# Patient Record
Sex: FEMALE | Race: WHITE | NOT HISPANIC OR LATINO | Employment: OTHER | URBAN - METROPOLITAN AREA
[De-identification: names, ages, dates, MRNs, and addresses within clinical notes are randomized per-mention and may not be internally consistent; named-entity substitution may affect disease eponyms.]

---

## 2021-03-04 ENCOUNTER — LAB VISIT (OUTPATIENT)
Dept: LAB | Facility: OTHER | Age: 76
End: 2021-03-04
Payer: MEDICARE

## 2021-03-04 DIAGNOSIS — Z20.822 ENCOUNTER FOR LABORATORY TESTING FOR COVID-19 VIRUS: ICD-10-CM

## 2021-03-04 PROCEDURE — U0003 INFECTIOUS AGENT DETECTION BY NUCLEIC ACID (DNA OR RNA); SEVERE ACUTE RESPIRATORY SYNDROME CORONAVIRUS 2 (SARS-COV-2) (CORONAVIRUS DISEASE [COVID-19]), AMPLIFIED PROBE TECHNIQUE, MAKING USE OF HIGH THROUGHPUT TECHNOLOGIES AS DESCRIBED BY CMS-2020-01-R: HCPCS | Performed by: NURSE PRACTITIONER

## 2021-03-05 LAB — SARS-COV-2 RNA RESP QL NAA+PROBE: NOT DETECTED

## 2021-03-11 ENCOUNTER — LAB VISIT (OUTPATIENT)
Dept: LAB | Facility: OTHER | Age: 76
End: 2021-03-11
Payer: MEDICARE

## 2021-03-11 DIAGNOSIS — Z20.822 ENCOUNTER FOR LABORATORY TESTING FOR COVID-19 VIRUS: ICD-10-CM

## 2021-03-11 PROCEDURE — U0003 INFECTIOUS AGENT DETECTION BY NUCLEIC ACID (DNA OR RNA); SEVERE ACUTE RESPIRATORY SYNDROME CORONAVIRUS 2 (SARS-COV-2) (CORONAVIRUS DISEASE [COVID-19]), AMPLIFIED PROBE TECHNIQUE, MAKING USE OF HIGH THROUGHPUT TECHNOLOGIES AS DESCRIBED BY CMS-2020-01-R: HCPCS | Performed by: NURSE PRACTITIONER

## 2021-03-12 LAB — SARS-COV-2 RNA RESP QL NAA+PROBE: NOT DETECTED

## 2021-03-18 ENCOUNTER — LAB VISIT (OUTPATIENT)
Dept: LAB | Facility: OTHER | Age: 76
End: 2021-03-18
Payer: MEDICARE

## 2021-03-18 DIAGNOSIS — Z20.822 ENCOUNTER FOR LABORATORY TESTING FOR COVID-19 VIRUS: ICD-10-CM

## 2021-03-18 PROCEDURE — U0003 INFECTIOUS AGENT DETECTION BY NUCLEIC ACID (DNA OR RNA); SEVERE ACUTE RESPIRATORY SYNDROME CORONAVIRUS 2 (SARS-COV-2) (CORONAVIRUS DISEASE [COVID-19]), AMPLIFIED PROBE TECHNIQUE, MAKING USE OF HIGH THROUGHPUT TECHNOLOGIES AS DESCRIBED BY CMS-2020-01-R: HCPCS | Performed by: NURSE PRACTITIONER

## 2021-03-19 LAB — SARS-COV-2 RNA RESP QL NAA+PROBE: NOT DETECTED

## 2022-03-27 ENCOUNTER — HOSPITAL ENCOUNTER (EMERGENCY)
Facility: HOSPITAL | Age: 77
Discharge: NURSING, REHAB OR LONG-TERM CARE | End: 2022-03-28
Attending: EMERGENCY MEDICINE
Payer: MEDICARE

## 2022-03-27 DIAGNOSIS — S32.010A CLOSED COMPRESSION FRACTURE OF BODY OF L1 VERTEBRA: Primary | ICD-10-CM

## 2022-03-27 DIAGNOSIS — M25.552 LEFT HIP PAIN: ICD-10-CM
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
## 2022-03-27 DIAGNOSIS — M79.605 LEFT LEG PAIN: ICD-10-CM

## 2022-03-27 PROCEDURE — 25000003 PHARM REV CODE 250: Performed by: EMERGENCY MEDICINE

## 2022-03-27 PROCEDURE — 99284 EMERGENCY DEPT VISIT MOD MDM: CPT | Mod: 25

## 2022-03-27 RX ORDER — ERGOCALCIFEROL 1.25 MG/1
50000 CAPSULE ORAL
COMMUNITY

## 2022-03-27 RX ORDER — TRAMADOL HYDROCHLORIDE 50 MG/1
50 TABLET ORAL EVERY 6 HOURS PRN
COMMUNITY
Start: 2022-03-14 | End: 2022-03-27

## 2022-03-27 RX ORDER — MIRABEGRON 25 MG/1
25 TABLET, FILM COATED, EXTENDED RELEASE ORAL DAILY
COMMUNITY
Start: 2021-10-07 | End: 2022-03-27

## 2022-03-27 RX ORDER — DONEPEZIL HYDROCHLORIDE 5 MG/1
5 TABLET, FILM COATED ORAL NIGHTLY
COMMUNITY

## 2022-03-27 RX ORDER — AMLODIPINE BESYLATE 2.5 MG/1
2.5 TABLET ORAL DAILY
COMMUNITY

## 2022-03-27 RX ORDER — HYDROCODONE BITARTRATE AND ACETAMINOPHEN 5; 325 MG/1; MG/1
1 TABLET ORAL
Status: COMPLETED | OUTPATIENT
Start: 2022-03-27 | End: 2022-03-27

## 2022-03-27 RX ORDER — APIXABAN 2.5 MG/1
2.5 TABLET, FILM COATED ORAL 2 TIMES DAILY
COMMUNITY
Start: 2022-01-31

## 2022-03-27 RX ORDER — QUETIAPINE FUMARATE 25 MG/1
25 TABLET, FILM COATED ORAL NIGHTLY
COMMUNITY

## 2022-03-27 RX ORDER — OXYBUTYNIN CHLORIDE 5 MG/1
5 TABLET ORAL EVERY MORNING
COMMUNITY

## 2022-03-27 RX ORDER — ACETAMINOPHEN 325 MG/1
650 TABLET ORAL
Status: COMPLETED | OUTPATIENT
Start: 2022-03-27 | End: 2022-03-27

## 2022-03-27 RX ORDER — LOSARTAN POTASSIUM 50 MG/1
50 TABLET ORAL 2 TIMES DAILY
COMMUNITY

## 2022-03-27 RX ADMIN — HYDROCODONE BITARTRATE AND ACETAMINOPHEN 1 TABLET: 5; 325 TABLET ORAL at 10:03

## 2022-03-27 RX ADMIN — ACETAMINOPHEN 650 MG: 325 TABLET ORAL at 08:03

## 2022-03-27 NOTE — PHARMACY MED REC
"[facility]           Pharmacy  Admission Medication History     The home medication history was taken by Moraima Yoon.      Medication history obtained from Medications listed below were obtained from: Nursing home    Based on information gathered for medication list, you may go to "Admission" then "Reconcile Home Medications" tabs to review and/or act upon those items.      The home medication list has been updated by the Pharmacy department.    Please read ALL comments highlighted in yellow.    Please address this information as you see fit.     Feel free to contact us if you have any questions or require assistance.    The medications listed below were removed from the home medication list.  Please reorder if appropriate:     Patient reports NOT TAKING the following medication(s):  o Tramadol 50mg  o myrbetriq 25mg      No current facility-administered medications on file prior to encounter.     Current Outpatient Medications on File Prior to Encounter   Medication Sig Dispense Refill    amLODIPine (NORVASC) 2.5 MG tablet Take 2.5 mg by mouth once daily.      donepeziL (ARICEPT) 5 MG tablet Take 5 mg by mouth every evening.      ergocalciferol (ERGOCALCIFEROL) 50,000 unit Cap Take 50,000 Units by mouth every Wednesday.      losartan (COZAAR) 50 MG tablet Take 50 mg by mouth 2 (two) times a day.      oxybutynin (DITROPAN) 5 MG Tab Take 5 mg by mouth every morning.      QUEtiapine (SEROQUEL) 25 MG Tab Take 25 mg by mouth every evening.      ELIQUIS 2.5 mg Tab Take 2.5 mg by mouth 2 (two) times daily.      [DISCONTINUED] MYRBETRIQ 25 mg Tb24 ER tablet Take 25 mg by mouth once daily.      [DISCONTINUED] traMADoL (ULTRAM) 50 mg tablet Take 50 mg by mouth every 6 (six) hours as needed.         Please address this information as you see fit.  Feel free to contact us if you have any questions or require assistance.    Moraima Yoon  [phone]                  .          "

## 2022-03-27 NOTE — ED PROVIDER NOTES
Encounter Date: 3/27/2022       History     Chief Complaint   Patient presents with    Fall     Unwitnessed fall at nursing home this morning. Has been ambulating all day with baseline MS. Nursing staff noticed that she was limping this afternoon. - presents awake, alert with c/o left hip pain. No shortening or rotation. + pulses.     Patient is a 77 yo WF who presents to the ED via EMS with the complaint of fall. Pt reportedly had an unwitnessed fall at her nursing home this AM. Pt states that she slipped of the toilet earlier today. Pt was seen limping this afternoon by NH staff and was subsequently sent to the ED for evaluation. Pt reports pain to the L hip and femur. She denies hitting her head or any LOC.         Review of patient's allergies indicates:  No Known Allergies  Past Medical History:   Diagnosis Date    Hypertension     Stroke     Unspecified dementia without behavioral disturbance     Vitamin D deficiency      History reviewed. No pertinent surgical history.  History reviewed. No pertinent family history.  Social History     Tobacco Use    Smoking status: Unknown If Ever Smoked   Substance Use Topics    Alcohol use: Not Currently    Drug use: Never     Review of Systems   Constitutional: Negative for chills and fever.   Gastrointestinal: Negative for abdominal pain and nausea.   Genitourinary: Negative for dysuria and flank pain.   Musculoskeletal: Positive for back pain and gait problem. Negative for neck pain and neck stiffness.   Neurological: Negative for dizziness and headaches.   Psychiatric/Behavioral: Negative for agitation and confusion.       Physical Exam     Initial Vitals [03/27/22 1704]   BP Pulse Resp Temp SpO2   (!) 181/103 83 16 97.6 °F (36.4 °C) 98 %      MAP       --         Physical Exam    Constitutional: She appears well-developed and well-nourished.   HENT:   Head: Normocephalic and atraumatic.   Right Ear: External ear normal.   Left Ear: External ear normal.   No  signs of head trauma    Eyes: EOM are normal. Pupils are equal, round, and reactive to light.   Neck: Neck supple.   Normal ROM    Normal range of motion.  Pulmonary/Chest: Breath sounds normal.   Abdominal: Abdomen is soft. Bowel sounds are normal.   Musculoskeletal:         General: Normal range of motion.      Cervical back: Normal range of motion and neck supple.      Comments: Mild tenderness to the lower lumbar region  Normal ROM of the hip joint  Normal ROM of the L knee joint      Neurological: She is alert and oriented to person, place, and time. She has normal strength. GCS score is 15. GCS eye subscore is 4. GCS verbal subscore is 5. GCS motor subscore is 6.   Skin: Skin is warm. Capillary refill takes less than 2 seconds.         ED Course   Procedures  Labs Reviewed - No data to display       Imaging Results           CT Head Without Contrast (Final result)  Result time 03/27/22 18:58:01    Final result by Tristan Mazariegos DO (03/27/22 18:58:01)                 Impression:      1. Small nonspecific hyperdensities in the left cerebellar hemisphere and the right jeanna-liliana as above.  Findings may be related to dystrophic calcification or vascular malformations such as cavernomas or capillary telangiectasias.  Given history of trauma, tiny parenchymal hemorrhages are not excluded.  Follow-up imaging is recommended with consideration for brain MRI.  2. Thin CSF density right cerebral convexity extra-axial collection compatible with a chronic subdural hematoma or subdural hygroma.  3. Encephalomalacia in the right occipital lobe with remote changes of right parietooccipital craniotomy.  4. Remote lacunar infarction within the right basal ganglia.  5. Senescent changes and chronic microvascular ischemic changes.  This report was flagged in Epic as abnormal.    Dr. Mazariegos discussed critical findings with Dr. Stephens by telephone at 18:52 on 03/27/2022.      Electronically signed by: Tristan  Delilah  Date:    03/27/2022  Time:    18:58             Narrative:    EXAMINATION:  CT HEAD WITHOUT CONTRAST    CLINICAL HISTORY:  Head trauma, minor (Age >= 65y);    TECHNIQUE:  Low dose axial CT images obtained throughout the head without intravenous contrast. Sagittal and coronal reconstructions were performed.    COMPARISON:  None available.    FINDINGS:  There is a small hyperdense focus within the left cerebellar hemisphere measuring up to approximately 0.6 cm.  There are 2 nonspecific punctate hyperdensities in the right aspect of the liliana.    There is a thin CSF density right cerebral convexity extra-axial fluid collection compatible with a chronic subdural hematoma versus subdural hygroma.    There is encephalomalacia within the right occipital lobe.    There is a remote lacunar infarction within the right basal ganglia.    There is diffuse brain parenchymal volume loss and prominence of the CSF spaces.  No hydrocephalus.  There is hypoattenuation in the supratentorial white matter compatible with chronic microvascular ischemic changes.    There are atherosclerotic calcifications of the bilateral intracranial ICAs.    There are postop changes of right parietooccipital craniotomy.  The calvarium is otherwise intact.  The scalp is unremarkable.  Bilateral paranasal sinuses and mastoid air cells are clear.                               X-Ray Lumbar Spine Ap And Lateral (Final result)  Result time 03/27/22 18:59:41    Final result by Tristan Mazariegos DO (03/27/22 18:59:41)                 Impression:      Age-indeterminate compression fracture of the L1 vertebral body.  Correlate with point tenderness to assess acuity.    Multilevel degenerative changes as above.      Electronically signed by: Tristan Mazariegos  Date:    03/27/2022  Time:    18:59             Narrative:    EXAMINATION:  XR LUMBAR SPINE AP AND LATERAL    CLINICAL HISTORY:  back pain;    TECHNIQUE:  AP, lateral and spot images were performed of the  lumbar spine.    COMPARISON:  None    FINDINGS:  There is an age-indeterminate compression fracture of the L1 vertebral body with approximately 50% loss of height and retropulsion measuring approximately 5 mm.  Remaining vertebral body heights are maintained.  There is moderate disc height loss at L2-L3.  There are multilevel degenerative changes with facet arthropathy at L3-L4 on the left and at L5-S1.  Remaining visualized osseous structures are intact.  There are right upper quadrant surgical clips.  There are atherosclerotic calcifications.                               X-Ray Hips Bilateral 2 View Incl AP Pelvis (Final result)  Result time 03/27/22 19:01:34    Final result by Tristan Mazariegos DO (03/27/22 19:01:34)                 Impression:      No evidence of an acute fracture or dislocation of the pelvis or bilateral hips.      Electronically signed by: Tristan Mazariegos  Date:    03/27/2022  Time:    19:01             Narrative:    EXAMINATION:  XR HIPS BILATERAL 2 VIEW INCL AP PELVIS    CLINICAL HISTORY:  Pain in left hip    TECHNIQUE:  AP view of the pelvis and frogleg lateral views of both hips were performed.    COMPARISON:  None.    FINDINGS:  There is osteopenia.  There is no evidence of acute fracture or dislocation of the pelvis or bilateral hips.  The femoral heads are well seated within the acetabula.  Alignment is normal.  There is a calcified density overlying the right aspect of the pubic symphysis, likely an overlying phlebolith.                               X-Ray Femur Ap/Lat Left (Final result)  Result time 03/27/22 19:03:10    Final result by Tristan Mazariegos DO (03/27/22 19:03:10)                 Impression:      No acute osseous abnormality of the left femur.      Electronically signed by: Tristan Mazariegos  Date:    03/27/2022  Time:    19:03             Narrative:    EXAMINATION:  XR FEMUR 2 VIEW LEFT    CLINICAL HISTORY:  Pain in left leg    TECHNIQUE:  AP and lateral views of the left  femur were performed.    COMPARISON:  None.    FINDINGS:  There is osteopenia.  There is no evidence of acute fracture or dislocation of the left femur.  Alignment is normal.  Joint spaces are preserved.                                 Medications   acetaminophen tablet 650 mg (650 mg Oral Given 3/27/22 2035)     Medical Decision Making:   Clinical Tests:   Radiological Study: Ordered and Reviewed  ED Management:  - CT head WO demonstrates small nonspecific hyperdensities in the left cerebellar hemisphere and the right jeanna-liliana as above.  Findings may be related to dystrophic calcification or vascular malformations such as cavernomas or capillary telangiectasias.  Given history of trauma, tiny parenchymal hemorrhages are not excluded.  Follow-up imaging is recommended with consideration for brain MRI; thin CSF density right cerebral convexity extra-axial collection compatible with a chronic subdural hematoma or subdural hygroma; encephalomalacia in the right occipital lobe with remote changes of right parietooccipital craniotomy; remote lacunar infarction within the right basal ganglia; senescent changes and chronic microvascular ischemic changes per final radiology read (discussed CT head findings with reading radiologist, Dr. Tristan Mazariegos; he did not feel that the patient required a STAT MRI in light of his reported findings)   - plain radiograph of the lumbar spine demonstrates an L1 compression fracture   - plain radiograph of the L femur demonstrates no acute abnormality per my interpretation, final radiology read  - plan radiograph of the bilateral hips and pelvis demonstrates no acute fracture or dislocation per my interpretation, final radiology read  - will provide TLSO brace to patient in light of age-indeterminate L1 compression fracture; will recommend appropriately-dosed Tylenol, as needed, for pain; recommend patient avoids strenuous activities, no heavy lifting or bending   - No further intervention  is indicated at this time after having taken into account the patient's history, physical exam findings, and empirical and objective data obtained during the patient's emergency department workup.   - The patient is at low risk for an emergent medical condition at this time, and I am of the belief that that it is safe to discharge the patient from the emergency department.   - The patient is instructed to follow up as outpatient as indicated on the discharge paperwork.    - I have discussed the specifics of the workup with the patient and the patient has verbalized understanding of the details of the workup, the diagnosis, the treatment plan, and the need for outpatient follow-up.    - Although the patient has no emergent etiology today this does not preclude the development of an emergent condition so, in addition, I have advised the patient that they can return to the ED and/or activate EMS at any time with worsening of their symptoms, change of their symptoms, or with any other medical complaint.    - The patient remained comfortable and stable during their visit in the ED.    - Discharge and follow-up instructions discussed with the patient who expressed understanding and willingness to comply with my recommendations.  - Results of all emergency department tests  discussed thoroughly with patient; all patient questions answered; pt in agreement with plan  - Pt instructed to follow up with PCP in 2-3 days for recheck of today's complaints  - Pt given strict emergency department return precautions for any new or worsening of symptoms  - Pt discharged from the emergency department in stable condition, in no acute distress                         Clinical Impression:   Final diagnoses:  [M25.552] Left hip pain  [M79.605] Left leg pain  [S32.010A] Closed compression fracture of body of L1 vertebra (Primary)  [W19.XXXA] Fall, initial encounter          ED Disposition Condition    Discharge Stable        ED  Prescriptions     None        Follow-up Information     Follow up With Specialties Details Why Contact Info    Your primary care physician               Calvin Stephens MD  03/27/22 0546       Calvin Stephens MD  03/27/22 9328

## 2022-03-28 VITALS
TEMPERATURE: 98 F | DIASTOLIC BLOOD PRESSURE: 98 MMHG | RESPIRATION RATE: 20 BRPM | OXYGEN SATURATION: 95 % | HEIGHT: 62 IN | SYSTOLIC BLOOD PRESSURE: 148 MMHG | HEART RATE: 100 BPM | BODY MASS INDEX: 25.76 KG/M2 | WEIGHT: 140 LBS

## 2022-03-28 PROCEDURE — 25000003 PHARM REV CODE 250: Performed by: EMERGENCY MEDICINE

## 2022-03-28 RX ORDER — HYDROCODONE BITARTRATE AND ACETAMINOPHEN 5; 325 MG/1; MG/1
1 TABLET ORAL
Status: COMPLETED | OUTPATIENT
Start: 2022-03-28 | End: 2022-03-28

## 2022-03-28 RX ORDER — HYDROCODONE BITARTRATE AND ACETAMINOPHEN 5; 325 MG/1; MG/1
1 TABLET ORAL EVERY 6 HOURS PRN
Qty: 12 TABLET | Refills: 0 | Status: SHIPPED | OUTPATIENT
Start: 2022-03-28 | End: 2022-03-31

## 2022-03-28 RX ADMIN — HYDROCODONE BITARTRATE AND ACETAMINOPHEN 1 TABLET: 5; 325 TABLET ORAL at 02:03

## 2022-03-28 NOTE — DISCHARGE INSTRUCTIONS
Please follow up with your primary care physician in the next 2-3 days for recheck of today's complaints.     Please avoid strenuous activities, heavy lifting or bending.

## 2022-12-01 ENCOUNTER — HOSPITAL ENCOUNTER (EMERGENCY)
Facility: HOSPITAL | Age: 77
Discharge: HOME | End: 2022-12-02
Attending: EMERGENCY MEDICINE
Payer: MEDICARE
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
## 2022-12-01 DIAGNOSIS — S52.501A CLOSED FRACTURE OF DISTAL END OF RIGHT RADIUS, UNSPECIFIED FRACTURE MORPHOLOGY, INITIAL ENCOUNTER: Primary | ICD-10-CM
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
## 2022-12-01 LAB
ANION GAP SERPL CALC-SCNC: 8 MMOL/L (ref 8–16)
BASOPHILS # BLD AUTO: 0.07 K/UL (ref 0–0.2)
BASOPHILS NFR BLD: 0.5 % (ref 0–1.9)
BUN SERPL-MCNC: 21 MG/DL (ref 8–23)
CALCIUM SERPL-MCNC: 9 MG/DL (ref 8.7–10.5)
CHLORIDE SERPL-SCNC: 107 MMOL/L (ref 95–110)
CO2 SERPL-SCNC: 22 MMOL/L (ref 23–29)
CREAT SERPL-MCNC: 0.8 MG/DL (ref 0.5–1.4)
DIFFERENTIAL METHOD: ABNORMAL
EOSINOPHIL # BLD AUTO: 0.1 K/UL (ref 0–0.5)
EOSINOPHIL NFR BLD: 0.8 % (ref 0–8)
ERYTHROCYTE [DISTWIDTH] IN BLOOD BY AUTOMATED COUNT: 14.4 % (ref 11.5–14.5)
EST. GFR  (NO RACE VARIABLE): >60 ML/MIN/1.73 M^2
GLUCOSE SERPL-MCNC: 104 MG/DL (ref 70–110)
HCT VFR BLD AUTO: 40.4 % (ref 37–48.5)
HGB BLD-MCNC: 13.2 G/DL (ref 12–16)
IMM GRANULOCYTES # BLD AUTO: 0.05 K/UL (ref 0–0.04)
IMM GRANULOCYTES NFR BLD AUTO: 0.4 % (ref 0–0.5)
LYMPHOCYTES # BLD AUTO: 2.1 K/UL (ref 1–4.8)
LYMPHOCYTES NFR BLD: 15.4 % (ref 18–48)
MCH RBC QN AUTO: 29.5 PG (ref 27–31)
MCHC RBC AUTO-ENTMCNC: 32.7 G/DL (ref 32–36)
MCV RBC AUTO: 90 FL (ref 82–98)
MONOCYTES # BLD AUTO: 1 K/UL (ref 0.3–1)
MONOCYTES NFR BLD: 7.1 % (ref 4–15)
NEUTROPHILS # BLD AUTO: 10.2 K/UL (ref 1.8–7.7)
NEUTROPHILS NFR BLD: 75.8 % (ref 38–73)
NRBC BLD-RTO: 0 /100 WBC
PLATELET # BLD AUTO: 319 K/UL (ref 150–450)
PMV BLD AUTO: 11.1 FL (ref 9.2–12.9)
POTASSIUM SERPL-SCNC: 4.3 MMOL/L (ref 3.5–5.1)
RBC # BLD AUTO: 4.48 M/UL (ref 4–5.4)
SODIUM SERPL-SCNC: 137 MMOL/L (ref 136–145)
WBC # BLD AUTO: 13.49 K/UL (ref 3.9–12.7)

## 2022-12-01 PROCEDURE — 96374 THER/PROPH/DIAG INJ IV PUSH: CPT

## 2022-12-01 PROCEDURE — 80048 BASIC METABOLIC PNL TOTAL CA: CPT | Performed by: EMERGENCY MEDICINE

## 2022-12-01 PROCEDURE — 25000003 PHARM REV CODE 250

## 2022-12-01 PROCEDURE — 93010 EKG 12-LEAD: ICD-10-PCS | Mod: ,,, | Performed by: INTERNAL MEDICINE

## 2022-12-01 PROCEDURE — 93005 ELECTROCARDIOGRAM TRACING: CPT

## 2022-12-01 PROCEDURE — 93010 ELECTROCARDIOGRAM REPORT: CPT | Mod: ,,, | Performed by: INTERNAL MEDICINE

## 2022-12-01 PROCEDURE — 99285 EMERGENCY DEPT VISIT HI MDM: CPT | Mod: ,,, | Performed by: EMERGENCY MEDICINE

## 2022-12-01 PROCEDURE — 99285 PR EMERGENCY DEPT VISIT,LEVEL V: ICD-10-PCS | Mod: ,,, | Performed by: EMERGENCY MEDICINE

## 2022-12-01 PROCEDURE — 63600175 PHARM REV CODE 636 W HCPCS

## 2022-12-01 PROCEDURE — 99285 EMERGENCY DEPT VISIT HI MDM: CPT | Mod: 25

## 2022-12-01 PROCEDURE — 96375 TX/PRO/DX INJ NEW DRUG ADDON: CPT

## 2022-12-01 PROCEDURE — 85025 COMPLETE CBC W/AUTO DIFF WBC: CPT | Performed by: EMERGENCY MEDICINE

## 2022-12-01 RX ORDER — OXYCODONE HYDROCHLORIDE 5 MG/1
5 TABLET ORAL
Status: COMPLETED | OUTPATIENT
Start: 2022-12-01 | End: 2022-12-01

## 2022-12-01 RX ORDER — LOSARTAN POTASSIUM 50 MG/1
50 TABLET ORAL DAILY
Status: DISCONTINUED | OUTPATIENT
Start: 2022-12-02 | End: 2022-12-02 | Stop reason: HOSPADM

## 2022-12-01 RX ORDER — KETOROLAC TROMETHAMINE 30 MG/ML
15 INJECTION, SOLUTION INTRAMUSCULAR; INTRAVENOUS
Status: COMPLETED | OUTPATIENT
Start: 2022-12-01 | End: 2022-12-01

## 2022-12-01 RX ADMIN — KETOROLAC TROMETHAMINE 15 MG: 30 INJECTION, SOLUTION INTRAMUSCULAR at 11:12

## 2022-12-01 RX ADMIN — OXYCODONE 5 MG: 5 TABLET ORAL at 10:12

## 2022-12-02 VITALS
DIASTOLIC BLOOD PRESSURE: 77 MMHG | TEMPERATURE: 98 F | RESPIRATION RATE: 17 BRPM | OXYGEN SATURATION: 92 % | SYSTOLIC BLOOD PRESSURE: 151 MMHG | HEART RATE: 95 BPM

## 2022-12-02 PROBLEM — S52.501A CLOSED FRACTURE OF RIGHT DISTAL RADIUS: Status: ACTIVE | Noted: 2022-12-02

## 2022-12-02 PROCEDURE — 63600175 PHARM REV CODE 636 W HCPCS: Performed by: EMERGENCY MEDICINE

## 2022-12-02 PROCEDURE — 25605 CLTX DST RDL FX/EPHYS SEP W/: CPT | Mod: RT

## 2022-12-02 PROCEDURE — 25000003 PHARM REV CODE 250: Performed by: STUDENT IN AN ORGANIZED HEALTH CARE EDUCATION/TRAINING PROGRAM

## 2022-12-02 PROCEDURE — 25000003 PHARM REV CODE 250

## 2022-12-02 RX ORDER — OXYCODONE HYDROCHLORIDE 5 MG/1
10 TABLET ORAL
Status: COMPLETED | OUTPATIENT
Start: 2022-12-02 | End: 2022-12-02

## 2022-12-02 RX ORDER — OXYCODONE HYDROCHLORIDE 5 MG/1
5 TABLET ORAL
Status: COMPLETED | OUTPATIENT
Start: 2022-12-02 | End: 2022-12-02

## 2022-12-02 RX ORDER — OXYCODONE HYDROCHLORIDE 5 MG/1
5 TABLET ORAL EVERY 8 HOURS PRN
Qty: 9 TABLET | Refills: 0 | Status: SHIPPED | OUTPATIENT
Start: 2022-12-02 | End: 2022-12-05

## 2022-12-02 RX ORDER — DROPERIDOL 2.5 MG/ML
0.62 INJECTION, SOLUTION INTRAMUSCULAR; INTRAVENOUS ONCE
Status: COMPLETED | OUTPATIENT
Start: 2022-12-02 | End: 2022-12-02

## 2022-12-02 RX ORDER — METHOCARBAMOL 500 MG/1
500 TABLET, FILM COATED ORAL 3 TIMES DAILY
Qty: 9 TABLET | Refills: 0 | Status: SHIPPED | OUTPATIENT
Start: 2022-12-02 | End: 2022-12-02 | Stop reason: CLARIF

## 2022-12-02 RX ORDER — OXYCODONE HYDROCHLORIDE 5 MG/1
5 TABLET ORAL EVERY 8 HOURS PRN
Qty: 9 TABLET | Refills: 0 | Status: SHIPPED | OUTPATIENT
Start: 2022-12-02 | End: 2022-12-02 | Stop reason: SDUPTHER

## 2022-12-02 RX ADMIN — OXYCODONE 10 MG: 5 TABLET ORAL at 06:12

## 2022-12-02 RX ADMIN — OXYCODONE 5 MG: 5 TABLET ORAL at 04:12

## 2022-12-02 RX ADMIN — DROPERIDOL 0.62 MG: 2.5 INJECTION, SOLUTION INTRAMUSCULAR; INTRAVENOUS at 06:12

## 2022-12-02 NOTE — HPI
76 yo F with a h/o dementia, multiple falls, subdural hematoma and recent hospitalization for UTI who presents to the ED after experiencing a mechanical fall at her nursing home. Daughter present at bedside who provided history. Daughter states that patient livings at nursing home and was getting out of bed for dinner. As she was getting out of bed, she fell. The fall was unwitnessed. There is apparent deformity present on right wrist, with extensive swelling. Denies use of blood thinners or LOC. Her hearing is impaired. Left hand dominant. Walks with a walker at baseline.

## 2022-12-02 NOTE — PLAN OF CARE
ANGIE sent Home Health referrals out to 25 different agencies. Case management will follow up later today.     ALEK Bro, MSW-LMSW  Medical Social Worker/  ER Department

## 2022-12-02 NOTE — SUBJECTIVE & OBJECTIVE
Past Medical History:   Diagnosis Date    Hypertension     Stroke     Unspecified dementia without behavioral disturbance     Vitamin D deficiency        History reviewed. No pertinent surgical history.    Review of patient's allergies indicates:  No Known Allergies    Current Facility-Administered Medications   Medication    losartan tablet 50 mg    oxyCODONE immediate release tablet 10 mg     Current Outpatient Medications   Medication Sig    amLODIPine (NORVASC) 2.5 MG tablet Take 2.5 mg by mouth once daily.    donepeziL (ARICEPT) 5 MG tablet Take 5 mg by mouth every evening.    ELIQUIS 2.5 mg Tab Take 2.5 mg by mouth 2 (two) times daily.    ergocalciferol (ERGOCALCIFEROL) 50,000 unit Cap Take 50,000 Units by mouth every Wednesday.    losartan (COZAAR) 50 MG tablet Take 50 mg by mouth 2 (two) times a day.    oxybutynin (DITROPAN) 5 MG Tab Take 5 mg by mouth every morning.    QUEtiapine (SEROQUEL) 25 MG Tab Take 25 mg by mouth every evening.     Family History    None       Tobacco Use    Smoking status: Unknown    Smokeless tobacco: Not on file   Substance and Sexual Activity    Alcohol use: Not Currently    Drug use: Never    Sexual activity: Not on file     ROS  Constitutional: negative for fevers  Eyes: negative visual changes  ENT: positive for hearing loss  Respiratory: negative for dyspnea  Cardiovascular: negative for chest pain  Gastrointestinal: negative for abdominal pain  Genitourinary: negative for dysuria  Neurological: negative for headaches  Behavioral/Psych: negative for hallucinations  Endocrine: negative for temperature intolerance    Objective:     Vital Signs (Most Recent):  Temp: 98.2 °F (36.8 °C) (12/01/22 1903)  Pulse: 73 (12/01/22 2153)  Resp: 18 (12/01/22 2203)  BP: (!) 195/88 (12/01/22 2153)  SpO2: 97 % (12/01/22 2153)   Vital Signs (24h Range):  Temp:  [98.2 °F (36.8 °C)] 98.2 °F (36.8 °C)  Pulse:  [72-73] 73  Resp:  [18] 18  SpO2:  [97 %] 97 %  BP: (150-195)/(88-90) 195/88            There is no height or weight on file to calculate BMI.    No intake or output data in the 24 hours ending 12/02/22 0109    Ortho/SPM Exam  General:  no acute distress, ill-appearing  Neuro: alert. Waxing/waning orientation  Psych: pleasantly demented  Head: normocephalic, atraumatic.  Eyes: no scleral icterus  Mouth: moist mucous membranes  Cardiovascular: extremities warm and well perfused  Lungs: breathing comfortably, equal chest rise bilat  Skin: clean, dry, intact (any exceptions noted in below musculoskeletal exam)    MSK:  RUE:  - Skin intact throughout, no open wounds  - Significant swelling, deformity at distal radius  - TTP at distal radius  - AROM and PROM of the shoulder, elbow intact without pain  - Axillary/AIN/PIN/Radial/Median/Ulnar Nerves assessed in isolation without deficit  - SILT throughout  - Compartments soft  - Radial artery palpated   - Capillary Refill <3s    LUE:  - Skin intact throughout, no open wounds  - No swelling  - No ecchymosis, erythema, or signs of cellulitis  - NonTTP throughout  - AROM and PROM of the shoulder, elbow, wrist, and hand intact without pain  - Axillary/AIN/PIN/Radial/Median/Ulnar Nerves assessed in isolation without deficit  - SILT throughout  - Compartments soft  - Radial artery palpated   - Capillary Refill <3s    RLE:  - Skin intact throughout, no open wounds  - No swelling  - No ecchymosis, erythema, or signs of cellulitis  - NonTTP throughout  - AROM and PROM of the hip, knee, ankle, and foot intact without pain  - TA/EHL/Gastroc/FHL assessed in isolation without deficit  - SILT throughout  - Compartments soft  - DP and PT palpated  - Capillary Refill <3s      LLE:  - Skin intact throughout, no open wounds  - No swelling  - No ecchymosis, erythema, or signs of cellulitis  - NonTTP throughout  - AROM and PROM of the hip, knee, ankle, and foot intact without pain  - TA/EHL/Gastroc/FHL assessed in isolation without deficit  - SILT throughout  - Compartments  soft  - DP and PT palpated  - Capillary Refill <3s      Spine/pelvis/axial body:  No tenderness to palpation of cervical, thoracic, or lumbar spine  No pain with compression of pelvis  No chest wall or abdominal tenderness  No decubitus ulcers      Significant Labs: CBC:   Recent Labs   Lab 12/01/22  2209   WBC 13.49*   HGB 13.2   HCT 40.4        CMP:   Recent Labs   Lab 12/01/22  2036      K 4.3      CO2 22*      BUN 21   CREATININE 0.8   CALCIUM 9.0   ANIONGAP 8     All pertinent labs within the past 24 hours have been reviewed.    Significant Imaging: I have reviewed and interpreted all pertinent imaging results/findings.  Dorsally displaced distal radius fracture

## 2022-12-02 NOTE — PLAN OF CARE
12/02/22 0224   Post-Acute Status   Post-Acute Authorization Home Health   Home Health Status Pending medical clearance/testing   Discharge Delays Orders Needed   Discharge Plan   Discharge Plan A Assisted Living;Home Health   Discharge Plan B Assisted Living;Home Health     ANGIE met with patient's family member at bedside. Patient lives in an assisted living facility. Patient get services from [facility]. ANGIE explained to the patient's family that the patient should be able to receive certain care form the facility. Daughter stated that she will contact the facility in the morning once open to let the facility know about the accommodations that the patient may need upon returning.     ANGIE will send referrals for home health with PT/PT. Pending insurance approval. ANGIE has notified case management to continue care with this case.    ALEK Bro, MSW-LMSW  Medical Social Worker/  ER Department

## 2022-12-02 NOTE — ED NOTES
Eli Tran, a 77 y.o. female presents to the ED w/ complaint of unwitnessed fall. Reports hitting head, redness/swelling noted to R forehead. Denies LOC or blood thinners. Deformity noted to R wrist. Daughter reports Hx of stroke and many falls in the past. AAOx4    Triage note:  Chief Complaint   Patient presents with    Fall     Pt from [facility] after unwitnessed fall. Deformity noted to R wrist. +peripheral pulses. Hit head but -LOC. - blood thinners. Baseline confused     Review of patient's allergies indicates:  No Known Allergies  Past Medical History:   Diagnosis Date    Hypertension     Stroke     Unspecified dementia without behavioral disturbance     Vitamin D deficiency     Patient identifiers for Eli Tran checked and correct.    LOC: The patient is awake, alert and aware of environment with an appropriate affect, the patient is oriented x 4 and speaking appropriately.  APPEARANCE: Patient resting comfortably and in no acute distress, patient is clean and well groomed, patient's clothing is properly fastened.  SKIN: The skin is warm and dry, color consistent with ethnicity, patient has normal skin turgor and moist mucus membranes, skin intact. +redness/swelling to R forehead  MUSCULOSKELETAL: Patient moving all extremities well, no obvious swelling or deformities noted. +unable to assess R arm d/t arm in sling, pain to R upper and lower extremities  RESPIRATORY: Airway is open and patent, respirations are spontaneous and even, patient has a normal effort and rate.  CARDIAC: Patient has a normal rate and rhythm, no periphreal edema noted, capillary refill < 3 seconds. Normal +2 pedal pulses present.  ABDOMEN: Soft and non tender to palpation, no distention noted. Patient denies any nausea, vomiting, diarrhea, or constipation.   NEUROLOGIC: Eyes open spontaneously, PERRL, behavior appropriate to situation, follows commands, facial expression symmetrical, bilateral hand grasp equal and even,  purposeful motor response noted, normal sensation in all extremities.

## 2022-12-02 NOTE — ASSESSMENT & PLAN NOTE
Eli Tran is a left hand dominant 77 y.o. female with PMH dementia, SDH, multiple recent falls presenting with right distal radius fracture, closed, NVI. Uses a walker for ambulation, lives at nursing home. Here with her daughter. Diagnosis discussed in detail with daughter. Closed reduction and splinting was performed in the ED.     Procedure Note: Right distal radius reduction  Patient was explained risks, benefits, and alternatives to treatment and verbalized consent to proceed. Time out was performed and patient name, , site, and procedure were confirmed. 10 cc of 1% lidocaine in 25 gauge needle was used for hematoma block. Fracture was reduced under fluoroscopy. Sugar tong splint was applied in typical fashion. Post-reduction films were performed and confirmed adequate reduction. Patient tolerated the procedure well.     - NWB to RUE in sling, pt encouraged to keep extremity iced and elevated at all times  - Patient's daughter educated on the signs and symptoms of compartment syndrome and instructed to return to the hospital immediately if these symptoms arise  - Multimodal pain control  - Follow-up with [facility] for further evaluation and management (patient's daughter will be contacted with appointment details)

## 2022-12-02 NOTE — ED NOTES
[facility]      HOME HEALTH ORDERS  FACE TO FACE ENCOUNTER    Patient Name: Eli Tran  YOB: 1945    PCP: Primary Doctor No   PCP Address: None  PCP Phone Number: None  PCP Fax: None    Encounter Date: 12/1/22    Admit to Home Health    Diagnoses:  Active Hospital Problems    Diagnosis  POA    *Closed fracture of right distal radius [S52.501A]  Yes      Resolved Hospital Problems   No resolved problems to display.       Follow Up Appointments:  No future appointments.    Allergies:Review of patient's allergies indicates:  No Known Allergies    Medications: Review discharge medications with patient and family and provide education.    Current Facility-Administered Medications   Medication Dose Route Frequency Provider Last Rate Last Admin    losartan tablet 50 mg  50 mg Oral Daily Snow Ha MD        oxyCODONE immediate release tablet 10 mg  10 mg Oral ED 1 Time Snow Ha MD         Current Outpatient Medications   Medication Sig Dispense Refill    amLODIPine (NORVASC) 2.5 MG tablet Take 2.5 mg by mouth once daily.      donepeziL (ARICEPT) 5 MG tablet Take 5 mg by mouth every evening.      ELIQUIS 2.5 mg Tab Take 2.5 mg by mouth 2 (two) times daily.      ergocalciferol (ERGOCALCIFEROL) 50,000 unit Cap Take 50,000 Units by mouth every Wednesday.      losartan (COZAAR) 50 MG tablet Take 50 mg by mouth 2 (two) times a day.      oxybutynin (DITROPAN) 5 MG Tab Take 5 mg by mouth every morning.      QUEtiapine (SEROQUEL) 25 MG Tab Take 25 mg by mouth every evening.       Current Discharge Medication List        CONTINUE these medications which have NOT CHANGED    Details   amLODIPine (NORVASC) 2.5 MG tablet Take 2.5 mg by mouth once daily.      donepeziL (ARICEPT) 5 MG tablet Take 5 mg by mouth every evening.      ELIQUIS 2.5 mg Tab Take 2.5 mg by mouth 2 (two) times daily.      ergocalciferol (ERGOCALCIFEROL) 50,000 unit Cap Take 50,000 Units by mouth every Wednesday.       losartan (COZAAR) 50 MG tablet Take 50 mg by mouth 2 (two) times a day.      oxybutynin (DITROPAN) 5 MG Tab Take 5 mg by mouth every morning.      QUEtiapine (SEROQUEL) 25 MG Tab Take 25 mg by mouth every evening.               I have seen and examined this patient within the last 30 days. My clinical findings that support the need for the home health skilled services and home bound status are the following:no   Weakness/numbness causing balance and gait disturbance due to Fracture making it taxing to leave home.     Diet:   regular diet    Labs:  Report Lab results to PCP.    Referrals/ Consults  Physical Therapy to evaluate and treat. Evaluate for home safety and equipment needs; Establish/upgrade home exercise program. Perform / instruct on therapeutic exercises, gait training, transfer training, and Range of Motion.    Activities:   activity as tolerated    Nursing:   Agency to admit patient within 24 hours of hospital discharge unless specified on physician order or at patient request    SN to complete comprehensive assessment including routine vital signs. Instruct on disease process and s/s of complications to report to MD. Review/verify medication list sent home with the patient at time of discharge  and instruct patient/caregiver as needed. Frequency may be adjusted depending on start of care date.     Skilled nurse to perform up to 3 visits PRN for symptoms related to diagnosis    Notify MD if SBP > 160 or < 90; DBP > 90 or < 50; HR > 120 or < 50; Temp > 101; O2 < 88%; Other:       Ok to schedule additional visits based on staff availability and patient request on consecutive days within the home health episode.    When multiple disciplines ordered:    Start of Care occurs on Sunday - Wednesday schedule remaining discipline evaluations as ordered on separate consecutive days following the start of care.    Thursday SOC -schedule subsequent evaluations Friday and Monday the following week.     Friday -  Saturday SOC - schedule subsequent discipline evaluations on consecutive days starting Monday of the following week.    For all post-discharge communication and subsequent orders please contact patient's primary care physician. If unable to reach primary care physician or do not receive response within 30 minutes, please contact PCP for clinical staff order clarification    Miscellaneous   Routine Skin for Bedridden Patients: Instruct patient/caregiver to apply moisture barrier cream to all skin folds and wet areas in perineal area daily and after baths and all bowel movements.    Home Health Aide:  Nursing Three times weekly    Wound Care Orders  no    I certify that this patient is confined to her home and needs physical therapy and occupational therapy.

## 2022-12-02 NOTE — PLAN OF CARE
Dtr at bedside  AVS discussed in detail  All questions answered  Dtr to transport home.    Addendum:  Fall risk concerns and dtr transporting her home alone  Ms Tran will go home via non emergent ambulance  Adt 30 completed    [facility] - Emergency Dept  Discharge Final Note    Primary Care Provider: Primary Doctor No    Expected Discharge Date:     Final Discharge Note (most recent)       Final Note - 12/02/22 1247          Final Note    Assessment Type Final Discharge Note (P)      Anticipated Discharge Disposition Home-Health Care Svc (P)      Hospital Resources/Appts/Education Provided Provided patient/caregiver with written discharge plan information (P)         Post-Acute Status    Post-Acute Authorization Home Health (P)      Home Health Status Set-up Complete/Auth obtained (P)      Patient choice form signed by patient/caregiver List with quality metrics by geographic area provided;List from System Post-Acute Care (P)      Discharge Delays None known at this time (P)                      Important Message from Medicare             Contact Info       [facility]   Specialty: Orthopedics    [address] [facility] [address]   Phone: [phone]       Next Steps: Follow up in 1 week(s)    PCP        Next Steps: Follow up in 1 week(s)    Instructions: See your PCP in 1 week    [facility]   Specialty: Home Health Services    [address]  [address]  [facility] [address]   Phone: [phone]       Next Steps: Follow up in 1 day(s)    Instructions: Home Health    [facility]   Specialty: DME Provider    [address] [place] [id number]   Phone: [phone]       Next Steps: Follow up    Instructions: DME- W/C

## 2022-12-02 NOTE — DISCHARGE INSTRUCTIONS
If you begin to start to experiencing numbness, tingling, changes in warmth, or color in the right arm, please return to the ED

## 2022-12-02 NOTE — ED NOTES
Phoned Yolanda (Daughter) to inform her of pt's discharge. Daughter said she should be picking up pt within the hour. Went over discharge paperwork with daughter. Discharge instructions left with Denis, Paramedic, at nurses station.

## 2022-12-02 NOTE — CONSULTS
[facility]  Orthopedics  Consult Note    Patient Name: Eli Tran  MRN: [id number]  Admission Date: 12/1/2022  Hospital Length of Stay: 0 days  Attending Provider: No att. providers found  Primary Care Provider: Primary Doctor No      Inpatient consult to Orthopedic Surgery  Consult performed by: Jose Luis Rivera MD  Consult ordered by: Adams Ramos MD        Subjective:     Principal Problem:Closed fracture of right distal radius    Chief Complaint:   Chief Complaint   Patient presents with    Fall     Pt from [facility] after unwitnessed fall. Deformity noted to R wrist. +peripheral pulses. Hit head but -LOC. - blood thinners. Baseline confused        HPI: 78 yo F with a h/o dementia, multiple falls, subdural hematoma and recent hospitalization for UTI who presents to the ED after experiencing a mechanical fall at her nursing home. Daughter present at bedside who provided history. Daughter states that patient livings at nursing home and was getting out of bed for dinner. As she was getting out of bed, she fell. The fall was unwitnessed. There is apparent deformity present on right wrist, with extensive swelling. Denies use of blood thinners or LOC. Her hearing is impaired. Left hand dominant. Walks with a walker at baseline.       Past Medical History:   Diagnosis Date    Hypertension     Stroke     Unspecified dementia without behavioral disturbance     Vitamin D deficiency        History reviewed. No pertinent surgical history.    Review of patient's allergies indicates:  No Known Allergies    Current Facility-Administered Medications   Medication    losartan tablet 50 mg    oxyCODONE immediate release tablet 10 mg     Current Outpatient Medications   Medication Sig    amLODIPine (NORVASC) 2.5 MG tablet Take 2.5 mg by mouth once daily.    donepeziL (ARICEPT) 5 MG tablet Take 5 mg by mouth every evening.    ELIQUIS 2.5 mg Tab Take 2.5 mg by mouth 2 (two) times daily.     ergocalciferol (ERGOCALCIFEROL) 50,000 unit Cap Take 50,000 Units by mouth every Wednesday.    losartan (COZAAR) 50 MG tablet Take 50 mg by mouth 2 (two) times a day.    oxybutynin (DITROPAN) 5 MG Tab Take 5 mg by mouth every morning.    QUEtiapine (SEROQUEL) 25 MG Tab Take 25 mg by mouth every evening.     Family History    None       Tobacco Use    Smoking status: Unknown    Smokeless tobacco: Not on file   Substance and Sexual Activity    Alcohol use: Not Currently    Drug use: Never    Sexual activity: Not on file     ROS  Constitutional: negative for fevers  Eyes: negative visual changes  ENT: positive for hearing loss  Respiratory: negative for dyspnea  Cardiovascular: negative for chest pain  Gastrointestinal: negative for abdominal pain  Genitourinary: negative for dysuria  Neurological: negative for headaches  Behavioral/Psych: negative for hallucinations  Endocrine: negative for temperature intolerance    Objective:     Vital Signs (Most Recent):  Temp: 98.2 °F (36.8 °C) (12/01/22 1903)  Pulse: 73 (12/01/22 2153)  Resp: 18 (12/01/22 2203)  BP: (!) 195/88 (12/01/22 2153)  SpO2: 97 % (12/01/22 2153)   Vital Signs (24h Range):  Temp:  [98.2 °F (36.8 °C)] 98.2 °F (36.8 °C)  Pulse:  [72-73] 73  Resp:  [18] 18  SpO2:  [97 %] 97 %  BP: (150-195)/(88-90) 195/88           There is no height or weight on file to calculate BMI.    No intake or output data in the 24 hours ending 12/02/22 0109    Ortho/SPM Exam  General:  no acute distress, ill-appearing  Neuro: alert. Waxing/waning orientation  Psych: pleasantly demented  Head: normocephalic, atraumatic.  Eyes: no scleral icterus  Mouth: moist mucous membranes  Cardiovascular: extremities warm and well perfused  Lungs: breathing comfortably, equal chest rise bilat  Skin: clean, dry, intact (any exceptions noted in below musculoskeletal exam)    MSK:  RUE:  - Skin intact throughout, no open wounds  - Significant swelling, deformity at distal radius  - TTP at  distal radius  - AROM and PROM of the shoulder, elbow intact without pain  - Axillary/AIN/PIN/Radial/Median/Ulnar Nerves assessed in isolation without deficit  - SILT throughout  - Compartments soft  - Radial artery palpated   - Capillary Refill <3s    LUE:  - Skin intact throughout, no open wounds  - No swelling  - No ecchymosis, erythema, or signs of cellulitis  - NonTTP throughout  - AROM and PROM of the shoulder, elbow, wrist, and hand intact without pain  - Axillary/AIN/PIN/Radial/Median/Ulnar Nerves assessed in isolation without deficit  - SILT throughout  - Compartments soft  - Radial artery palpated   - Capillary Refill <3s    RLE:  - Skin intact throughout, no open wounds  - No swelling  - No ecchymosis, erythema, or signs of cellulitis  - NonTTP throughout  - AROM and PROM of the hip, knee, ankle, and foot intact without pain  - TA/EHL/Gastroc/FHL assessed in isolation without deficit  - SILT throughout  - Compartments soft  - DP and PT palpated  - Capillary Refill <3s      LLE:  - Skin intact throughout, no open wounds  - No swelling  - No ecchymosis, erythema, or signs of cellulitis  - NonTTP throughout  - AROM and PROM of the hip, knee, ankle, and foot intact without pain  - TA/EHL/Gastroc/FHL assessed in isolation without deficit  - SILT throughout  - Compartments soft  - DP and PT palpated  - Capillary Refill <3s      Spine/pelvis/axial body:  No tenderness to palpation of cervical, thoracic, or lumbar spine  No pain with compression of pelvis  No chest wall or abdominal tenderness  No decubitus ulcers      Significant Labs: CBC:   Recent Labs   Lab 12/01/22  2209   WBC 13.49*   HGB 13.2   HCT 40.4        CMP:   Recent Labs   Lab 12/01/22  2036      K 4.3      CO2 22*      BUN 21   CREATININE 0.8   CALCIUM 9.0   ANIONGAP 8     All pertinent labs within the past 24 hours have been reviewed.    Significant Imaging: I have reviewed and interpreted all pertinent imaging  results/findings.  Dorsally displaced distal radius fracture    Assessment/Plan:     * Closed fracture of right distal radius  Eli Tran is a left hand dominant 77 y.o. female with PMH dementia, SDH, multiple recent falls presenting with right distal radius fracture, closed, NVI. Uses a walker for ambulation, lives at nursing home. Here with her daughter. Diagnosis discussed in detail with daughter. Closed reduction and splinting was performed in the ED.     Procedure Note: Right distal radius reduction  Patient was explained risks, benefits, and alternatives to treatment and verbalized consent to proceed. Time out was performed and patient name, , site, and procedure were confirmed. 10 cc of 1% lidocaine in 25 gauge needle was used for hematoma block. Fracture was reduced under fluoroscopy. Sugar tong splint was applied in typical fashion. Post-reduction films were performed and confirmed adequate reduction. Patient tolerated the procedure well.     - NWB to RUE in sling, pt encouraged to keep extremity iced and elevated at all times  - Patient's daughter educated on the signs and symptoms of compartment syndrome and instructed to return to the hospital immediately if these symptoms arise  - Multimodal pain control  - Follow-up with Ortho Clinic for further evaluation and management (patient's daughter will be contacted with appointment details)        Jose Luis Rivera MD  Orthopedics  [facility]

## 2022-12-02 NOTE — ED PROVIDER NOTES
Encounter Date: 12/1/2022       History     Chief Complaint   Patient presents with    Fall     Pt from [facility] after unwitnessed fall. Deformity noted to R wrist. +peripheral pulses. Hit head but -LOC. - blood thinners. Baseline confused     Ms. Tran is a 76 yo F with a h/o dementia, multiple falls, subdural hematoma and recent hospitalization for UTI who presents to the ED after experiencing a mechanical fall at her assisted living facility. Daughter present at bedside who provided history. Daughter states that patient livings at nursing home and was getting out of bed for dinner. As she was getting out of bed, she fell. The fall was unwitnessed. There is apparent deformity present on right wrist, with extensive swelling. Denies use of blood thinners or LOC. Patient is AAOX3, however at times would appear confused and c/o generalized pain. Her hearing is impaired.         Review of patient's allergies indicates:  No Known Allergies  Past Medical History:   Diagnosis Date    Hypertension     Stroke     Unspecified dementia without behavioral disturbance     Vitamin D deficiency      History reviewed. No pertinent surgical history.  History reviewed. No pertinent family history.  Social History     Tobacco Use    Smoking status: Unknown   Substance Use Topics    Alcohol use: Not Currently    Drug use: Never     Review of Systems   Reason unable to perform ROS: ROS limited by pt's underlying dementia.   HENT:  Positive for trouble swallowing.    Cardiovascular:  Negative for chest pain.   Gastrointestinal:  Negative for constipation.   Genitourinary:  Negative for difficulty urinating.   Musculoskeletal:  Positive for joint swelling.   Neurological:  Positive for weakness.   Psychiatric/Behavioral:  Negative for agitation. The patient is not nervous/anxious.      Physical Exam     Initial Vitals [12/01/22 1903]   BP Pulse Resp Temp SpO2   (!) 150/90 72 18 98.2 °F (36.8 °C) 97 %      MAP       --          Physical Exam    Nursing note and vitals reviewed.    Gen: AxOx3, well nourished, appears older stated age, no pallor, no jaundice, appears dehydrated  Eye: EOMI, no scleral icterus, no periorbital edema or ecchymosis  Head: normocephalic, abrasion noted on right anterior scalp  ENT: neck supple, no stridor, no masses, no drooling or voice changes  CVS: All distal pulses intact with normal rate and rhythm, no JVD, normal S1/S2, no murmur  Pulm: Normal breath sounds, no wheezes, rales or rhonchi, no increased work of breathing  Abd: soft, nontender, nondistended, no organomegaly, no CVAT  Ext: no edema, right wrist noted with deformity and edema, limited ROM and tenderness on left LE, no lesions, rashes, or deformity  Neuro: GCS15, moving all extremities, gait intact, face grossly symmetric  Psych: normal affect, appears confused at times, cooperative, well groomed, makes good eye contact          ED Course   Procedures  Labs Reviewed   BASIC METABOLIC PANEL - Abnormal; Notable for the following components:       Result Value    CO2 22 (*)     All other components within normal limits   CBC W/ AUTO DIFFERENTIAL - Abnormal; Notable for the following components:    WBC 13.49 (*)     Gran # (ANC) 10.2 (*)     Immature Grans (Abs) 0.05 (*)     Gran % 75.8 (*)     Lymph % 15.4 (*)     All other components within normal limits          Imaging Results              CT Head Without Contrast (Final result)  Result time 12/02/22 06:22:23      Final result by Mike Esparza MD (12/02/22 06:22:23)                   Impression:      No CT evidence of acute intracranial abnormality.  Right frontal scalp soft tissue hematoma.  No depressed calvarial fracture.  Further evaluation and follow-up as warranted.    Chronic senescent and microvascular ischemic change.    Remote right occipital craniotomy with stable appearing underlying encephalomalacia.    Electronically signed by resident: Kriss  Alsaggaf  Date:    12/02/2022  Time:    05:44    Electronically signed by: Mike Esparza MD  Date:    12/02/2022  Time:    06:22               Narrative:    EXAMINATION:  CT HEAD WITHOUT CONTRAST    CLINICAL HISTORY:  Head trauma, minor (Age >= 65y);    TECHNIQUE:  Low dose axial CT images obtained throughout the head without intravenous contrast. Sagittal and coronal reconstructions were performed.    COMPARISON:  CT 03/27/2022    FINDINGS:  There is a stable appearing region of encephalomalacia within the right occipital lobe.  There is a right basal ganglia remote lacunar type infarct.  Stable nonspecific punctate calcification within the left cerebellar hemisphere and right liliana.  No evidence of acute intracranial hemorrhage or midline shift.  No large region of abnormal parenchymal hypoattenuation identified.    There is generalized cerebral volume loss compensatory sulcal widening and ventricular enlargement.  There is patchy periventricular white matter hypoattenuation suggesting sequelae of chronic microvascular ischemic change.  The basal cisterns are patent.    Skull/extracranial contents (limited evaluation): No calvarial fracture.  Postoperative changes of occipital craniotomy.  Right-sided scalp hematoma overlying the frontal calvarium.  There is trace opacification of the left sphenoid sinus and left mastoid air cells.  The remaining paranasal sinuses appear relatively well aerated.                                       CT Cervical Spine Without Contrast (Final result)  Result time 12/02/22 06:03:42      Final result by Mike Esparza MD (12/02/22 06:03:42)                   Impression:      1.  No evidence of acute fracture or traumatic subluxation of the cervical spine.  Mild multilevel degenerative changes as above.    2.  Nonspecific patchy airspace opacities within the left upper lobe which may relate to infection, inflammation, or aspiration.  Clinical correlation is advised.  Further  evaluation with dedicated thoracic imaging as warranted.      Electronically signed by: Mike Esparza MD  Date:    12/02/2022  Time:    06:03               Narrative:    EXAMINATION:  CT CERVICAL SPINE WITHOUT CONTRAST    CLINICAL HISTORY:  Neck trauma (Age >= 65y);    TECHNIQUE:  Low dose axial images, sagittal and coronal reformations were performed though the cervical spine.  Contrast was not administered.    COMPARISON:  None    FINDINGS:  There is minimal retrolisthesis of C4 on C5 and C5 on C6.  Cervical vertebral body heights appear adequately maintained.  No definite acute displaced fracture identified.  There is intervertebral disc space height loss and endplate degenerative change at the levels of C3-C4 through C5-C6.  At the level of C4-C5, there is a posterior disc osteophyte complex with mild to moderate right-sided neural foraminal narrowing.  At the level of C5-C6, there is a posterior disc osteophyte complex and bilateral facet arthropathy with mild bilateral neural foraminal narrowing.    There are partially visualized postsurgical changes of the right occipital calvarium.  Prevertebral soft tissues are not significantly thickened.  The trachea is patent.  There are patchy airspace opacities within the peripheral aspect of the visualized left upper lobe.  There is no pneumothorax.                                       X-Ray Wrist Complete Right (Final result)  Result time 12/02/22 01:33:07      Final result by Mike Esparza MD (12/02/22 01:33:07)                   Impression:      As above.      Electronically signed by: Mike Esparza MD  Date:    12/02/2022  Time:    01:33               Narrative:    EXAMINATION:  XR WRIST COMPLETE 3 VIEWS RIGHT    CLINICAL HISTORY:  Striking against unspecified object with subsequent fall, initial encounter    TECHNIQUE:  PA, lateral, and oblique views of the right wrist were performed.    COMPARISON:  12/01/2022    FINDINGS:  There has been interval  placement of overlying splinting material which obscures fine bony detail.  There is redemonstration of an acute mildly displaced fracture of the distal right radius.  The degree of dorsal angulation appears improved from prior examination.  No new skeletal abnormality identified.                                       X-Ray Wrist Complete Right (Final result)  Result time 12/01/22 23:58:12      Final result by Mike Esparza MD (12/01/22 23:58:12)                   Impression:      Acute, mildly displaced fracture of the distal right radius as above.      Electronically signed by: Mike Esparza MD  Date:    12/01/2022  Time:    23:58               Narrative:    EXAMINATION:  XR WRIST COMPLETE 3 VIEWS RIGHT; XR HAND COMPLETE 3 VIEW RIGHT; XR FOREARM RIGHT    CLINICAL HISTORY:  fall; Unspecified fall, initial encounter    TECHNIQUE:  PA, lateral, and oblique views of the right wrist and right hand were performed.  AP and lateral views of the right forearm were performed.    COMPARISON:  None    FINDINGS:  There is an acute, mildly displaced, mildly impacted fracture of the distal right radius.  There is dorsal angulation of the distal radius with loss of normal volar inclination.  No definite ulnar fracture appreciated.  Elbow alignment appears within normal limits.    No definite displaced fracture of the right hand appreciated, noting evaluation of the 4th and 5th digits is limited secondary to fixed flexion.  There are mild degenerative changes of the thumb CMC joint.                                       X-Ray Pelvis Routine AP (Final result)  Result time 12/01/22 23:53:00   Procedure changed from X-Ray Hip 2 or 3 views Left (with Pelvis when performed)     Final result by Mike Esparza MD (12/01/22 23:53:00)                   Impression:      No radiographic evidence of acute displaced fracture or dislocation      Electronically signed by: Mike Esparza MD  Date:    12/01/2022  Time:    23:53                Narrative:    EXAMINATION:  XR PELVIS ROUTINE AP; XR KNEE 1 OR 2 VIEW LEFT; XR FEMUR 2 VIEW LEFT    CLINICAL HISTORY:  Fall against object;; pain;  Striking against unspecified object with subsequent fall, initial encounter    TECHNIQUE:  AP view of the pelvis was performed.  AP and lateral views of the left femur and left knee were performed.    COMPARISON:  Left femur radiograph series 03/27/2022    FINDINGS:  There is no radiographic evidence of acute displaced fracture or dislocation.  The bilateral femoral heads appear appropriately seated within the acetabula.  The ilioischial and iliopectineal lines appear maintained.  Left knee alignment appears within normal limits.  No significant suprapatellar joint effusion appreciated.                                       X-Ray Knee 1 or 2 View Left (Final result)  Result time 12/01/22 23:53:00   Procedure changed from X-Ray Knee 3 View Left     Final result by Mike Esparza MD (12/01/22 23:53:00)                   Impression:      No radiographic evidence of acute displaced fracture or dislocation      Electronically signed by: Mike Esparza MD  Date:    12/01/2022  Time:    23:53               Narrative:    EXAMINATION:  XR PELVIS ROUTINE AP; XR KNEE 1 OR 2 VIEW LEFT; XR FEMUR 2 VIEW LEFT    CLINICAL HISTORY:  Fall against object;; pain;  Striking against unspecified object with subsequent fall, initial encounter    TECHNIQUE:  AP view of the pelvis was performed.  AP and lateral views of the left femur and left knee were performed.    COMPARISON:  Left femur radiograph series 03/27/2022    FINDINGS:  There is no radiographic evidence of acute displaced fracture or dislocation.  The bilateral femoral heads appear appropriately seated within the acetabula.  The ilioischial and iliopectineal lines appear maintained.  Left knee alignment appears within normal limits.  No significant suprapatellar joint effusion appreciated.                                        X-Ray Hand 3 View Right (Final result)  Result time 12/01/22 23:58:12      Final result by Mike Esparza MD (12/01/22 23:58:12)                   Impression:      Acute, mildly displaced fracture of the distal right radius as above.      Electronically signed by: Mike Esparza MD  Date:    12/01/2022  Time:    23:58               Narrative:    EXAMINATION:  XR WRIST COMPLETE 3 VIEWS RIGHT; XR HAND COMPLETE 3 VIEW RIGHT; XR FOREARM RIGHT    CLINICAL HISTORY:  fall; Unspecified fall, initial encounter    TECHNIQUE:  PA, lateral, and oblique views of the right wrist and right hand were performed.  AP and lateral views of the right forearm were performed.    COMPARISON:  None    FINDINGS:  There is an acute, mildly displaced, mildly impacted fracture of the distal right radius.  There is dorsal angulation of the distal radius with loss of normal volar inclination.  No definite ulnar fracture appreciated.  Elbow alignment appears within normal limits.    No definite displaced fracture of the right hand appreciated, noting evaluation of the 4th and 5th digits is limited secondary to fixed flexion.  There are mild degenerative changes of the thumb CMC joint.                                       X-Ray Forearm Right (Final result)  Result time 12/01/22 23:58:12      Final result by Mike Esparza MD (12/01/22 23:58:12)                   Impression:      Acute, mildly displaced fracture of the distal right radius as above.      Electronically signed by: Mike Esparza MD  Date:    12/01/2022  Time:    23:58               Narrative:    EXAMINATION:  XR WRIST COMPLETE 3 VIEWS RIGHT; XR HAND COMPLETE 3 VIEW RIGHT; XR FOREARM RIGHT    CLINICAL HISTORY:  fall; Unspecified fall, initial encounter    TECHNIQUE:  PA, lateral, and oblique views of the right wrist and right hand were performed.  AP and lateral views of the right forearm were performed.    COMPARISON:  None    FINDINGS:  There is an acute, mildly  displaced, mildly impacted fracture of the distal right radius.  There is dorsal angulation of the distal radius with loss of normal volar inclination.  No definite ulnar fracture appreciated.  Elbow alignment appears within normal limits.    No definite displaced fracture of the right hand appreciated, noting evaluation of the 4th and 5th digits is limited secondary to fixed flexion.  There are mild degenerative changes of the thumb CMC joint.                                       X-Ray Femur Ap/Lat Left (Final result)  Result time 12/01/22 23:53:00      Final result by Mike Esparza MD (12/01/22 23:53:00)                   Impression:      No radiographic evidence of acute displaced fracture or dislocation      Electronically signed by: Mike Esparza MD  Date:    12/01/2022  Time:    23:53               Narrative:    EXAMINATION:  XR PELVIS ROUTINE AP; XR KNEE 1 OR 2 VIEW LEFT; XR FEMUR 2 VIEW LEFT    CLINICAL HISTORY:  Fall against object;; pain;  Striking against unspecified object with subsequent fall, initial encounter    TECHNIQUE:  AP view of the pelvis was performed.  AP and lateral views of the left femur and left knee were performed.    COMPARISON:  Left femur radiograph series 03/27/2022    FINDINGS:  There is no radiographic evidence of acute displaced fracture or dislocation.  The bilateral femoral heads appear appropriately seated within the acetabula.  The ilioischial and iliopectineal lines appear maintained.  Left knee alignment appears within normal limits.  No significant suprapatellar joint effusion appreciated.                                       Medications   losartan tablet 50 mg (has no administration in time range)   oxyCODONE immediate release tablet 10 mg (0 mg Oral Hold 12/2/22 0030)   droperidoL injection 0.625 mg (has no administration in time range)   oxyCODONE immediate release tablet 5 mg (5 mg Oral Given 12/1/22 2203)   ketorolac injection 15 mg (15 mg Intravenous Given  12/1/22 4441)     Medical Decision Making:   Initial Assessment:   Ms. Tran is a 78 yo F with a h/o dementia, multiple falls, subdural hematoma and recent hospitalization for UTI who presents to the ED after experiencing a mechanical fall at her nursing home. PE shows abrasion on right anterior scalp and swelling on right wrist.   Differential Diagnosis:   Fracture of distal radius  Fall 2/2 worsening coordination in setting of dementia   Hypertension   Clinical Tests:   Lab Tests: Ordered and Reviewed       <> Summary of Lab: Slightly elevated WBC of 13.   Radiological Study: Ordered and Reviewed  ED Management:  Obtained basic labs and extensive imaging. X-ray of wrist,  hand, forearm. Ct service spine and CTH. X-ray of knee, pelvis, and femur.    Pt was hypertensive 195/88, spoke with daughter who mentioned possibility that patient did not receive her home BP meds. Administered X1 dose of home 50 mg losartan.   Consulted orthopedic surgery who reduced and splinted right wrist.   Disposition back to assisted living facility at 8am.           Attending Attestation:             Attending ED Notes:       HOME HEALTH ORDERS  FACE TO FACE ENCOUNTER    Patient Name: Eli Tran  YOB: 1945    Encounter Date: 12/1/22    Admit to Home Health    Diagnoses:  Active Hospital Problems    *Closed fracture of right distal radius [S52.501A]     POA: Yes      Resolved Hospital Problems  No resolved problems to display.      Follow Up Appointments:  No future appointments.    Allergies:Review of patient's allergies indicates:  No Known Allergies    Medications: Review discharge medications with patient and family and provide education.    [unfilled]  [unfilled]      I have seen and examined this patient within the last 30 days. My clinical findings that support the need for the home health skilled services and home bound status are the following:no   Requiring assistive device to leave home due to unsteady gait  caused by  Weakness/Debility.  Mental confusion making it unsafe for patient to leave home alone due to  Dementia.     Diet:   regular diet    Labs:      Referrals/ Consults  Physical Therapy to evaluate and treat. Evaluate for home safety and equipment needs; Establish/upgrade home exercise program. Perform / instruct on therapeutic exercises, gait training, transfer training, and Range of Motion.  Occupational Therapy to evaluate and treat. Evaluate home environment for safety and equipment needs. Perform/Instruct on transfers, ADL training, ROM, and therapeutic exercises.    Activities:   activity as tolerated    Nursing:   Agency to admit patient within 24 hours of hospital discharge unless specified on physician order or at patient request    SN to complete comprehensive assessment including routine vital signs. Instruct on disease process and s/s of complications to report to MD. Review/verify medication list sent home with the patient at time of discharge  and instruct patient/caregiver as needed. Frequency may be adjusted depending on start of care date.     Skilled nurse to perform up to 3 visits PRN for symptoms related to diagnosis    Notify MD if SBP > 160 or < 90; DBP > 90 or < 50; HR > 120 or < 50; Temp > 101; O2 < 88%; Other:       Ok to schedule additional visits based on staff availability and patient request on consecutive days within the home health episode.    When multiple disciplines ordered:    Start of Care occurs on Sunday - Wednesday schedule remaining discipline evaluations as ordered on separate consecutive days following the start of care.    Thursday SOC -schedule subsequent evaluations Friday and Monday the following week.     Friday - Saturday SOC - schedule subsequent discipline evaluations on consecutive days starting Monday of the following week.    For all post-discharge communication and subsequent orders please contact patient's primary care physician. If unable to reach primary  care physician or do not receive response within 30 minutes, please contact  PCP for clinical staff order clarification    Miscellaneous       Home Health Aide:  Nursing Weekly, Physical Therapy Three times weekly, and Occupational Therapy Three times weekly    Wound Care Orders  no    I certify that this patient is confined to her home and needs intermittent skilled nursing care, physical therapy, and occupational therapy.              ATTENDING PHYSICIAN ATTESTATION    I have personally seen and examined this patient and repeated the key portions of the resident's history and physical, reviewed and agree with the resident medical documentation, and supervised and managed the medical care of the patient.  I was present and supervising any critical portions of procedures performed      77-year-old female presents the ER for evaluation mechanical fall, right radius fracture noted, seen splinted by Orthopedics.  Follow-up outpatient basis.  CT scan negative for any acute traumatic process.  X-rays other than the wrist otherwise no acute process.  Patient did appear to have some sundowning while in the ER, she would screening moaning pain, when I would assess her, she would become, she would report that her right wrist hurt her.  I informed her she does have a fracture.  Her pain is well controlled.  We discussed with medicine social work team about possible admission, however patient lives in assisted living and they should be able to take care of her.  PT OT was ordered for her assistant living.  She will be discharged.  She will be given pain meds.    ED Course as of 12/02/22 0632   Thu Dec 01, 2022   2301 EKG read: Rate 72 beats per minute  Normal sinus rhythm   Left bundle-branch block   No significant ST elevation or depression  No previous images available [DS]      ED Course User Index  [DS] Eduin Ramírez MD                 Clinical Impression:   Final diagnoses:  [W19.XXXA] Fall  [W18.00XA] Fall against  object  [S52.501A] Closed fracture of distal end of right radius, unspecified fracture morphology, initial encounter (Primary)        ED Disposition Condition    Discharge Stable          ED Prescriptions       Medication Sig Dispense Start Date End Date Auth. Provider    oxyCODONE (ROXICODONE) 5 MG immediate release tablet  (Status: Discontinued) Take 1 tablet (5 mg total) by mouth every 8 (eight) hours as needed for Pain. 9 tablet 12/2/2022 12/2/2022 Snow Ha MD    methocarbamoL (ROBAXIN) 500 MG Tab  (Status: Discontinued) Take 1 tablet (500 mg total) by mouth 3 (three) times daily. for 3 days 9 tablet 12/2/2022 12/2/2022 Snow Ha MD    oxyCODONE (ROXICODONE) 5 MG immediate release tablet Take 1 tablet (5 mg total) by mouth every 8 (eight) hours as needed for Pain. 9 tablet 12/2/2022 12/5/2022 Sukumar Don MD          Follow-up Information       Follow up With Specialties Details Why Contact Info    [facility] Orthopedics In 1 week  [address]  [facility] [address]  [phone]             Sukumar Don MD  12/02/22 0638

## 2022-12-13 ENCOUNTER — HOSPITAL ENCOUNTER (OUTPATIENT)
Dept: RADIOLOGY | Facility: HOSPITAL | Age: 77
Discharge: HOME | End: 2022-12-13
Attending: PHYSICIAN ASSISTANT
Payer: MEDICARE

## 2022-12-13 ENCOUNTER — OFFICE VISIT (OUTPATIENT)
Dept: ORTHOPEDICS | Facility: CLINIC | Age: 77
End: 2022-12-13
Payer: MEDICARE

## 2022-12-13 DIAGNOSIS — M25.531 RIGHT WRIST PAIN: ICD-10-CM

## 2022-12-13 DIAGNOSIS — S52.551A OTHER CLOSED EXTRA-ARTICULAR FRACTURE OF DISTAL END OF RIGHT RADIUS, INITIAL ENCOUNTER: Primary | ICD-10-CM

## 2022-12-13 DIAGNOSIS — M25.531 RIGHT WRIST PAIN: Primary | ICD-10-CM

## 2022-12-13 PROCEDURE — 73110 XR WRIST COMPLETE 3 VIEWS RIGHT: ICD-10-PCS | Mod: 26,RT,, | Performed by: RADIOLOGY

## 2022-12-13 PROCEDURE — 73110 X-RAY EXAM OF WRIST: CPT | Mod: 26,RT,, | Performed by: RADIOLOGY

## 2022-12-13 PROCEDURE — 99203 PR OFFICE/OUTPT VISIT, NEW, LEVL III, 30-44 MIN: ICD-10-PCS | Mod: S$PBB,,, | Performed by: PHYSICIAN ASSISTANT

## 2022-12-13 PROCEDURE — 99999 PR PBB SHADOW E&M-EST. PATIENT-LVL II: CPT | Mod: PBBFAC,,, | Performed by: PHYSICIAN ASSISTANT

## 2022-12-13 PROCEDURE — 99212 OFFICE O/P EST SF 10 MIN: CPT | Mod: PBBFAC | Performed by: PHYSICIAN ASSISTANT

## 2022-12-13 PROCEDURE — 99999 PR PBB SHADOW E&M-EST. PATIENT-LVL II: ICD-10-PCS | Mod: PBBFAC,,, | Performed by: PHYSICIAN ASSISTANT

## 2022-12-13 PROCEDURE — 73110 X-RAY EXAM OF WRIST: CPT | Mod: TC,RT

## 2022-12-13 PROCEDURE — 99203 OFFICE O/P NEW LOW 30 MIN: CPT | Mod: S$PBB,,, | Performed by: PHYSICIAN ASSISTANT

## 2022-12-13 RX ORDER — HYDROCODONE BITARTRATE AND ACETAMINOPHEN 5; 325 MG/1; MG/1
1 TABLET ORAL EVERY 6 HOURS PRN
Qty: 10 TABLET | Refills: 0 | Status: SHIPPED | OUTPATIENT
Start: 2022-12-13 | End: 2022-12-13

## 2022-12-13 RX ORDER — HYDROCODONE BITARTRATE AND ACETAMINOPHEN 5; 325 MG/1; MG/1
1 TABLET ORAL EVERY 6 HOURS PRN
Qty: 10 TABLET | Refills: 0 | Status: SHIPPED | OUTPATIENT
Start: 2022-12-13 | End: 2023-01-03 | Stop reason: SDUPTHER

## 2022-12-13 NOTE — PROGRESS NOTES
[facility]  History & Physical  Orthopedics    SUBJECTIVE:      Chief Complaint: Right distal radius fracture    Referring Provider: Richard Ramos Dr. is the supervising physician for this encounter/patient    History of Present Illness:  Patient is a 77 y.o. left hand dominant female who presents today with complaints of right distal radius fracture, seen today with her daughter. The patient is very sleepy and sits in her wheelchair sleeping most of the visit, daughter relays history. Injury occurred on 12/1/22 when she fell at her nursing home. She was seen at [facility] ED, reduction of the distal radius fracture was performed by ortho on call. She has maintained reduction splint since then. She has been taking oxycodone as needed for pain.     Onset of symptoms/DOI was 12/1/22.    Symptoms are aggravated by activity and movement.    Symptoms are alleviated by rest and immobilization.    Symptoms consist of pain, swelling, erythema, and decreased ROM.    The patient rates their pain as a 3/10.    Attempted treatment(s) and/or interventions include activity modifications, rest, closed reduction in the emergency department.     The patient denies any fevers, chills, N/V, D/C and presents for evaluation.       Past Medical History:   Diagnosis Date    Hypertension     Stroke     Unspecified dementia without behavioral disturbance     Vitamin D deficiency      No past surgical history on file.  Review of patient's allergies indicates:  No Known Allergies  Social History     Social History Narrative    Not on file     No family history on file.      Current Outpatient Medications:     amLODIPine (NORVASC) 2.5 MG tablet, Take 2.5 mg by mouth once daily., Disp: , Rfl:     donepeziL (ARICEPT) 5 MG tablet, Take 5 mg by mouth every evening., Disp: , Rfl:     ergocalciferol (ERGOCALCIFEROL) 50,000 unit Cap, Take 50,000 Units by mouth every Wednesday., Disp: , Rfl:     losartan (COZAAR)  50 MG tablet, Take 50 mg by mouth 2 (two) times a day., Disp: , Rfl:     oxybutynin (DITROPAN) 5 MG Tab, Take 5 mg by mouth every morning., Disp: , Rfl:     QUEtiapine (SEROQUEL) 25 MG Tab, Take 25 mg by mouth every evening., Disp: , Rfl:     ELIQUIS 2.5 mg Tab, Take 2.5 mg by mouth 2 (two) times daily., Disp: , Rfl:       Review of Systems:  Constitutional: no fever or chills  Eyes: no visual changes  ENT: no nasal congestion or sore throat  Respiratory: no cough or shortness of breath  Cardiovascular: no chest pain  Gastrointestinal: no nausea or vomiting, tolerating diet  Musculoskeletal: pain, soreness, and decreased ROM    OBJECTIVE:      Vital Signs (Most Recent):  There were no vitals filed for this visit.  There is no height or weight on file to calculate BMI.      Physical Exam:  Constitutional: The patient appears well-developed and well-nourished. No distress.   Skin: No lesions appreciated  Head: Normocephalic and atraumatic.   Nose: Nose normal.   Ears: No deformities seen  Eyes: Conjunctivae and EOM are normal.   Neck: No tracheal deviation present.   Cardiovascular: Normal rate and intact distal pulses.    Pulmonary/Chest: Effort normal. No respiratory distress.   Abdominal: There is no guarding.   Neurological: The patient is alert.   Psychiatric: The patient has a normal mood and affect.     Right Hand/Wrist Examination:    Observation/Inspection:  Swelling  Mild right wrist/hand   Deformity  none  Discoloration  Healing ecchymosis present     Scars   none    Atrophy  none    HAND/WRIST EXAMINATION:  Finkelstein's Test   Neg  WHAT Test    Neg  Snuff box tenderness   Neg  Mera's Test    Neg  Hook of Hamate Tenderness  Neg  CMC grind    Neg  Circumduction test   Neg  TTP to the distal radius    Neurovascular Exam:  Digits WWP, brisk CR < 3s throughout  NVI motor/LTS to M/R/U nerves, radial pulse 2+  Tinel's Test - Carpal Tunnel  Neg  Tinel's Test - Cubital Tunnel  Neg  Phalen's Test    Neg  Median  Nerve Compression Test Neg    ROM hand: she does move her fingers when asked, does not make a full fist    ROM wrist: not assessed.    ROM elbow: not assessed.    Abdomen not guarded  Respirations nonlabored  Perfusion intact    Diagnostic Results:     Imaging - I independently viewed the patient's imaging as well as the radiology report.      FINDINGS:  Interval removal of previously noted splint material.  Reconfirmed mildly displaced apparently comminuted fracture involving the distal radial diametaphyseal region, appearance of fracture sites and position and alignment of fracture fragments not felt significantly changed.  As before, the main distal fracture fragment appears diastasied  laterally and volarly with respect to the proximal radial shaft fracture fragment.  There could be intra-articular extension of one of the fracture lines.  No new fractures.  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     Impression:     As above      ASSESSMENT/PLAN:      77 y.o. yo female with Right distal radius fracture    Plan: The patient and I had a thorough discussion today.  We discussed the working diagnosis as well as several other potential alternative diagnoses.  Treatment options were discussed, both conservative and surgical.  Conservative treatment options would include things such as activity modifications, workplace modifications, a period of rest, oral vs topical OTC and prescription anti-inflammatory medications, occupational therapy, splinting/bracing, immobilization, corticosteroid injections, and others.  Surgical options were discussed as well.     The daughter and I have a long discussion regarding risks/benefits of surgery vs conservative treatment. Dr. Villafana has reviewed films and does feel conservative treatment would be beneficial, daughter agrees with plan. She is placed in short arm fiberglass cast today, advised to remain non-weight  bearing as best as possible. I will see her back in 3 weeks for xrays out of cast and transition to wrist brace and start OT. Norco 5/325 ordered today as a step down for pain.    Should the patient's symptoms worsen, persist, or fail to improve they should return for reevaluation and I would be happy to see them back anytime.           Please do not hesitate to reach out to us via email, phone, or MyChart with any questions, concerns, or feedback.

## 2022-12-29 ENCOUNTER — PATIENT MESSAGE (OUTPATIENT)
Dept: ORTHOPEDICS | Facility: CLINIC | Age: 77
End: 2022-12-29
Payer: MEDICARE

## 2022-12-29 ENCOUNTER — HOSPITAL ENCOUNTER (EMERGENCY)
Facility: HOSPITAL | Age: 77
Discharge: HOME | End: 2022-12-30
Attending: EMERGENCY MEDICINE
Payer: MEDICARE

## 2022-12-29 DIAGNOSIS — S52.551A OTHER CLOSED EXTRA-ARTICULAR FRACTURE OF DISTAL END OF RIGHT RADIUS, INITIAL ENCOUNTER: Primary | ICD-10-CM

## 2022-12-29 DIAGNOSIS — R53.1 WEAKNESS: ICD-10-CM

## 2022-12-29 DIAGNOSIS — M25.551 RIGHT HIP PAIN: ICD-10-CM

## 2022-12-29 DIAGNOSIS — Z47.89 LOOSE CAST: Primary | ICD-10-CM

## 2022-12-29 DIAGNOSIS — N39.0 URINARY TRACT INFECTION WITHOUT HEMATURIA, SITE UNSPECIFIED: ICD-10-CM

## 2022-12-29 LAB
ALBUMIN SERPL BCP-MCNC: 3.6 G/DL (ref 3.5–5.2)
ALP SERPL-CCNC: 88 U/L (ref 55–135)
ALT SERPL W/O P-5'-P-CCNC: 13 U/L (ref 10–44)
ANION GAP SERPL CALC-SCNC: 10 MMOL/L (ref 8–16)
AST SERPL-CCNC: 13 U/L (ref 10–40)
BACTERIA #/AREA URNS HPF: ABNORMAL /HPF
BASOPHILS # BLD AUTO: 0.05 K/UL (ref 0–0.2)
BASOPHILS NFR BLD: 0.7 % (ref 0–1.9)
BILIRUB SERPL-MCNC: 0.1 MG/DL (ref 0.1–1)
BILIRUB UR QL STRIP: NEGATIVE
BUN SERPL-MCNC: 35 MG/DL (ref 8–23)
CALCIUM SERPL-MCNC: 9.3 MG/DL (ref 8.7–10.5)
CHLORIDE SERPL-SCNC: 107 MMOL/L (ref 95–110)
CLARITY UR: ABNORMAL
CO2 SERPL-SCNC: 23 MMOL/L (ref 23–29)
COLOR UR: YELLOW
CREAT SERPL-MCNC: 0.9 MG/DL (ref 0.5–1.4)
DIFFERENTIAL METHOD: ABNORMAL
EOSINOPHIL # BLD AUTO: 0.2 K/UL (ref 0–0.5)
EOSINOPHIL NFR BLD: 3.2 % (ref 0–8)
ERYTHROCYTE [DISTWIDTH] IN BLOOD BY AUTOMATED COUNT: 15 % (ref 11.5–14.5)
EST. GFR  (NO RACE VARIABLE): >60 ML/MIN/1.73 M^2
GLUCOSE SERPL-MCNC: 100 MG/DL (ref 70–110)
GLUCOSE UR QL STRIP: NEGATIVE
HCT VFR BLD AUTO: 40.9 % (ref 37–48.5)
HGB BLD-MCNC: 13 G/DL (ref 12–16)
HGB UR QL STRIP: NEGATIVE
HYALINE CASTS #/AREA URNS LPF: 0 /LPF
IMM GRANULOCYTES # BLD AUTO: 0.02 K/UL (ref 0–0.04)
IMM GRANULOCYTES NFR BLD AUTO: 0.3 % (ref 0–0.5)
KETONES UR QL STRIP: NEGATIVE
LEUKOCYTE ESTERASE UR QL STRIP: ABNORMAL
LYMPHOCYTES # BLD AUTO: 2 K/UL (ref 1–4.8)
LYMPHOCYTES NFR BLD: 26.3 % (ref 18–48)
MCH RBC QN AUTO: 29.2 PG (ref 27–31)
MCHC RBC AUTO-ENTMCNC: 31.8 G/DL (ref 32–36)
MCV RBC AUTO: 92 FL (ref 82–98)
MICROSCOPIC COMMENT: ABNORMAL
MONOCYTES # BLD AUTO: 0.6 K/UL (ref 0.3–1)
MONOCYTES NFR BLD: 8.4 % (ref 4–15)
NEUTROPHILS # BLD AUTO: 4.5 K/UL (ref 1.8–7.7)
NEUTROPHILS NFR BLD: 61.1 % (ref 38–73)
NITRITE UR QL STRIP: NEGATIVE
NRBC BLD-RTO: 0 /100 WBC
PH UR STRIP: 6 [PH] (ref 5–8)
PLATELET # BLD AUTO: 291 K/UL (ref 150–450)
PMV BLD AUTO: 10.9 FL (ref 9.2–12.9)
POTASSIUM SERPL-SCNC: 3.9 MMOL/L (ref 3.5–5.1)
PROT SERPL-MCNC: 6.9 G/DL (ref 6–8.4)
PROT UR QL STRIP: ABNORMAL
RBC # BLD AUTO: 4.45 M/UL (ref 4–5.4)
RBC #/AREA URNS HPF: 0 /HPF (ref 0–4)
SODIUM SERPL-SCNC: 140 MMOL/L (ref 136–145)
SP GR UR STRIP: >1.03 (ref 1–1.03)
SQUAMOUS #/AREA URNS HPF: 4 /HPF
TROPONIN I SERPL DL<=0.01 NG/ML-MCNC: 0.01 NG/ML (ref 0–0.03)
URN SPEC COLLECT METH UR: ABNORMAL
UROBILINOGEN UR STRIP-ACNC: ABNORMAL EU/DL
WBC # BLD AUTO: 7.42 K/UL (ref 3.9–12.7)
WBC #/AREA URNS HPF: 17 /HPF (ref 0–5)

## 2022-12-29 PROCEDURE — 29105 APPLICATION LONG ARM SPLINT: CPT | Mod: RT

## 2022-12-29 PROCEDURE — 99285 EMERGENCY DEPT VISIT HI MDM: CPT | Mod: 25

## 2022-12-29 PROCEDURE — 80053 COMPREHEN METABOLIC PANEL: CPT | Performed by: NURSE PRACTITIONER

## 2022-12-29 PROCEDURE — 25000003 PHARM REV CODE 250: Performed by: EMERGENCY MEDICINE

## 2022-12-29 PROCEDURE — 84484 ASSAY OF TROPONIN QUANT: CPT | Performed by: NURSE PRACTITIONER

## 2022-12-29 PROCEDURE — 87086 URINE CULTURE/COLONY COUNT: CPT | Performed by: NURSE PRACTITIONER

## 2022-12-29 PROCEDURE — 81000 URINALYSIS NONAUTO W/SCOPE: CPT | Performed by: NURSE PRACTITIONER

## 2022-12-29 PROCEDURE — 85025 COMPLETE CBC W/AUTO DIFF WBC: CPT | Performed by: NURSE PRACTITIONER

## 2022-12-29 PROCEDURE — 87088 URINE BACTERIA CULTURE: CPT | Performed by: NURSE PRACTITIONER

## 2022-12-29 RX ORDER — CEPHALEXIN 500 MG/1
500 CAPSULE ORAL EVERY 12 HOURS
Qty: 14 CAPSULE | Refills: 0 | Status: SHIPPED | OUTPATIENT
Start: 2022-12-29 | End: 2022-12-30 | Stop reason: SDUPTHER

## 2022-12-29 RX ORDER — HYDROCODONE BITARTRATE AND ACETAMINOPHEN 5; 325 MG/1; MG/1
1 TABLET ORAL
Status: COMPLETED | OUTPATIENT
Start: 2022-12-29 | End: 2022-12-29

## 2022-12-29 RX ADMIN — HYDROCODONE BITARTRATE AND ACETAMINOPHEN 1 TABLET: 5; 325 TABLET ORAL at 10:12

## 2022-12-29 NOTE — FIRST PROVIDER EVALUATION
Medical screening examination initiated.  I have conducted a focused provider triage encounter, findings are as follows:    Brief history of present illness:  77 yr old female presents to the ER with reports of weakness and removal on the right arm cast which was prematurely removed today. Pts daughter states she gets this way when she has a UTI. Denies fever, vomiting or diarrhea. No chest pain or sob.     Vitals:    12/29/22 1626   BP: (!) 141/98   BP Location: Left arm   Patient Position: Sitting   Pulse: 68   Resp: 15   Temp: 98.7 °F (37.1 °C)   TempSrc: Oral   SpO2: 97%   Weight: 72.6 kg (160 lb)       Pertinent physical exam:  Pallor, right arm in sling. AAO     Brief workup plan:  Labs    Preliminary workup initiated; this workup will be continued and followed by the physician or advanced practice provider that is assigned to the patient when roomed.

## 2022-12-30 ENCOUNTER — TELEPHONE (OUTPATIENT)
Dept: ORTHOPEDICS | Facility: CLINIC | Age: 77
End: 2022-12-30
Payer: MEDICARE

## 2022-12-30 VITALS
BODY MASS INDEX: 29.26 KG/M2 | WEIGHT: 160 LBS | OXYGEN SATURATION: 95 % | DIASTOLIC BLOOD PRESSURE: 70 MMHG | RESPIRATION RATE: 16 BRPM | HEART RATE: 62 BPM | TEMPERATURE: 99 F | SYSTOLIC BLOOD PRESSURE: 147 MMHG

## 2022-12-30 PROCEDURE — 25000003 PHARM REV CODE 250: Performed by: EMERGENCY MEDICINE

## 2022-12-30 RX ORDER — OXYCODONE AND ACETAMINOPHEN 5; 325 MG/1; MG/1
1 TABLET ORAL
Status: COMPLETED | OUTPATIENT
Start: 2022-12-30 | End: 2022-12-30

## 2022-12-30 RX ORDER — CEPHALEXIN 500 MG/1
500 CAPSULE ORAL EVERY 12 HOURS
Qty: 14 CAPSULE | Refills: 0 | Status: SHIPPED | OUTPATIENT
Start: 2022-12-30 | End: 2023-01-06

## 2022-12-30 RX ORDER — CEPHALEXIN 500 MG/1
500 CAPSULE ORAL EVERY 12 HOURS
Qty: 14 CAPSULE | Refills: 0 | Status: SHIPPED | OUTPATIENT
Start: 2022-12-30 | End: 2022-12-30 | Stop reason: SDUPTHER

## 2022-12-30 RX ORDER — CEPHALEXIN 500 MG/1
500 CAPSULE ORAL
Status: COMPLETED | OUTPATIENT
Start: 2022-12-30 | End: 2022-12-30

## 2022-12-30 RX ADMIN — OXYCODONE HYDROCHLORIDE AND ACETAMINOPHEN 1 TABLET: 5; 325 TABLET ORAL at 01:12

## 2022-12-30 RX ADMIN — CEPHALEXIN 500 MG: 500 CAPSULE ORAL at 12:12

## 2022-12-30 NOTE — TELEPHONE ENCOUNTER
Attempted to return the phone call regarding a cast replacement. No answer. I did leave a voicemail requesting the patient to be taken main campus before noon on 12/30/22 for a new cast to be placed. I will also attempt to speak to someone via the patient portal.      Sophia De Oliveira MA  Medical Assistant to Dr. Ross Dunbar [facility]

## 2022-12-30 NOTE — ED PROVIDER NOTES
Encounter Date: 12/29/2022       History     Chief Complaint   Patient presents with    Fatigue    Cast Problem     Broken R arm a few weeks ago, here today from assisted living due to cast falling off. Daughter also states pt has not been herself for a few days and believes it could be a UTI.      Eli Tran is a 77 y.o. female who  has a past medical history of Hypertension, Stroke, Unspecified dementia without behavioral disturbance, and Vitamin D deficiency.    The patient presents to the ED due to R leg and wrist pain.   Patient reports symptoms started earlier today.  She was seen in ED earlier and had a splint replaced, but afterward she was complaining of severe pain to her R hip and thigh.  Daughter states this has happened before, but never this severe.   Patient remains fixated on her R leg as the main source of her pain.     Review of patient's allergies indicates:  No Known Allergies  Past Medical History:   Diagnosis Date    Hypertension     Stroke     Unspecified dementia without behavioral disturbance     Vitamin D deficiency      No past surgical history on file.  No family history on file.  Social History     Tobacco Use    Smoking status: Unknown   Substance Use Topics    Alcohol use: Not Currently    Drug use: Never     Review of Systems   Unable to perform ROS: Dementia   Musculoskeletal:  Positive for arthralgias and myalgias.   Psychiatric/Behavioral:  Positive for agitation. The patient is nervous/anxious.      Physical Exam     Initial Vitals [12/29/22 1626]   BP Pulse Resp Temp SpO2   (!) 141/98 68 15 98.7 °F (37.1 °C) 97 %      MAP       --         Physical Exam    Constitutional: She appears well-developed and well-nourished. She is not diaphoretic. She appears distressed.   HENT:   Head: Head is with abrasion and with contusion.   Scattered facial bruising.   Eyes: EOM are normal. Pupils are equal, round, and reactive to light.   Cardiovascular:  Normal rate.           Pulmonary/Chest:  Breath sounds normal.   Abdominal: Abdomen is soft.   Musculoskeletal:      Comments: Diffuse TTP R thigh and LE. No deformity.   RUE in splint and sling.      Neurological: She is disoriented. She exhibits abnormal muscle tone. Gait abnormal. GCS eye subscore is 4. GCS verbal subscore is 4. GCS motor subscore is 6.   Unable to range extremities due to pain.        ED Course   Procedures  Labs Reviewed   URINALYSIS, REFLEX TO URINE CULTURE - Abnormal; Notable for the following components:       Result Value    Appearance, UA Hazy (*)     Specific Gravity, UA >1.030 (*)     Protein, UA 1+ (*)     Urobilinogen, UA 2.0-3.0 (*)     Leukocytes, UA 1+ (*)     All other components within normal limits    Narrative:     Specimen Source->Urine   CBC W/ AUTO DIFFERENTIAL - Abnormal; Notable for the following components:    MCHC 31.8 (*)     RDW 15.0 (*)     All other components within normal limits   COMPREHENSIVE METABOLIC PANEL - Abnormal; Notable for the following components:    BUN 35 (*)     All other components within normal limits   URINALYSIS MICROSCOPIC - Abnormal; Notable for the following components:    WBC, UA 17 (*)     Bacteria Few (*)     All other components within normal limits    Narrative:     Specimen Source->Urine   CULTURE, URINE   TROPONIN I          Imaging Results    None          Medications   HYDROcodone-acetaminophen 5-325 mg per tablet 1 tablet (1 tablet Oral Given 12/29/22 2251)   cephALEXin capsule 500 mg (500 mg Oral Given 12/30/22 0024)   oxyCODONE-acetaminophen 5-325 mg per tablet 1 tablet (1 tablet Oral Given 12/30/22 0109)     Medical Decision Making:   History:   Old Medical Records: I decided to obtain old medical records.  Old Records Summarized: records from clinic visits and other records.       <> Summary of Records: Seen in ED 12/1 after fall. X-rays revealed distal radius fracture.   Initial Assessment:   78 yo F with R leg pain, displaced cast to R arm.  Cast replaced by ED  staff.  Will obtain x-rays of R hip/thigh and reassess.   Differential Diagnosis:   Differential Diagnosis includes, but is not limited to:  Fracture, dislocation, compartment syndrome, nerve injury/palsy, vascular injury, DVT, rhabdomyolysis, hemarthrosis, septic joint, cellulitis, bursitis, muscle strain, ligament tear/sprain, laceration, foreign body, abrasion, soft tissue contusion, osteoarthritis.      Clinical Tests:   Radiological Study: Ordered and Reviewed  ED Management:  The sugar tong splint was applied by the ED staff under my direct supervision. The splint is clean/dry/intact and was applied without difficulty. There are no signs of neurovascular compromise after placement. The patient was instructed to follow-up with the orthopedic specialist as provided. I have given the patient strict and specific return precautions, including worsening pain, numbness, extremity color change, splint failure, or any other concerns.     X-rays pending at shift change.   Patient will be stable for D/C if x-rays show no fracture.                           Clinical Impression:   Final diagnoses:  [R53.1] Weakness  [Z47.89] Loose cast (Primary)  [N39.0] Urinary tract infection without hematuria, site unspecified  [M25.551] Right hip pain           ED Prescriptions       Medication Sig Dispense Start Date End Date Auth. Provider    cephALEXin (KEFLEX) 500 MG capsule Take 1 capsule (500 mg total) by mouth every 12 (twelve) hours. for 7 days 14 capsule 12/30/2022 1/6/2023 Stan Ryan III, MD          Follow-up Information       Follow up With Specialties Details Why Contact Info    ER   Return to the ER for worsened symptoms or for any other concerns.              Damion Kelly MD  12/30/22 0211

## 2022-12-30 NOTE — ED PROVIDER NOTES
Encounter Date: 12/29/2022       History     Chief Complaint   Patient presents with    Fatigue    Cast Problem     Broken R arm a few weeks ago, here today from assisted living due to cast falling off. Daughter also states pt has not been herself for a few days and believes it could be a UTI.      HPI  Review of patient's allergies indicates:  No Known Allergies  Past Medical History:   Diagnosis Date    Hypertension     Stroke     Unspecified dementia without behavioral disturbance     Vitamin D deficiency      No past surgical history on file.  No family history on file.  Social History     Tobacco Use    Smoking status: Unknown   Substance Use Topics    Alcohol use: Not Currently    Drug use: Never     Review of Systems    Physical Exam     Initial Vitals [12/29/22 1626]   BP Pulse Resp Temp SpO2   (!) 141/98 68 15 98.7 °F (37.1 °C) 97 %      MAP       --         Physical Exam    ED Course   Procedures  Labs Reviewed   URINALYSIS, REFLEX TO URINE CULTURE - Abnormal; Notable for the following components:       Result Value    Appearance, UA Hazy (*)     Specific Gravity, UA >1.030 (*)     Protein, UA 1+ (*)     Urobilinogen, UA 2.0-3.0 (*)     Leukocytes, UA 1+ (*)     All other components within normal limits    Narrative:     Specimen Source->Urine   CBC W/ AUTO DIFFERENTIAL - Abnormal; Notable for the following components:    MCHC 31.8 (*)     RDW 15.0 (*)     All other components within normal limits   COMPREHENSIVE METABOLIC PANEL - Abnormal; Notable for the following components:    BUN 35 (*)     All other components within normal limits   URINALYSIS MICROSCOPIC - Abnormal; Notable for the following components:    WBC, UA 17 (*)     Bacteria Few (*)     All other components within normal limits    Narrative:     Specimen Source->Urine   CULTURE, URINE   TROPONIN I          Imaging Results    None          Medications   HYDROcodone-acetaminophen 5-325 mg per tablet 1 tablet (1 tablet Oral Given 12/29/22  4182)                              Clinical Impression:   Final diagnoses:  [R53.1] Weakness  [Z47.89] Loose cast (Primary)  [N39.0] Urinary tract infection without hematuria, site unspecified               Stan Ryan III, MD  12/29/22 8092

## 2022-12-31 LAB — BACTERIA UR CULT: ABNORMAL

## 2023-01-03 ENCOUNTER — HOSPITAL ENCOUNTER (OUTPATIENT)
Dept: RADIOLOGY | Facility: HOSPITAL | Age: 78
Discharge: HOME | End: 2023-01-03
Attending: PHYSICIAN ASSISTANT
Payer: MEDICARE

## 2023-01-03 ENCOUNTER — OFFICE VISIT (OUTPATIENT)
Dept: ORTHOPEDICS | Facility: CLINIC | Age: 78
End: 2023-01-03
Payer: MEDICARE

## 2023-01-03 DIAGNOSIS — S52.551A OTHER CLOSED EXTRA-ARTICULAR FRACTURE OF DISTAL END OF RIGHT RADIUS, INITIAL ENCOUNTER: Primary | ICD-10-CM

## 2023-01-03 DIAGNOSIS — S52.551A OTHER CLOSED EXTRA-ARTICULAR FRACTURE OF DISTAL END OF RIGHT RADIUS, INITIAL ENCOUNTER: ICD-10-CM

## 2023-01-03 PROCEDURE — 99999 PR PBB SHADOW E&M-EST. PATIENT-LVL II: ICD-10-PCS | Mod: PBBFAC,,, | Performed by: PHYSICIAN ASSISTANT

## 2023-01-03 PROCEDURE — 99213 PR OFFICE/OUTPT VISIT, EST, LEVL III, 20-29 MIN: ICD-10-PCS | Mod: S$PBB,,, | Performed by: PHYSICIAN ASSISTANT

## 2023-01-03 PROCEDURE — 99212 OFFICE O/P EST SF 10 MIN: CPT | Mod: PBBFAC | Performed by: PHYSICIAN ASSISTANT

## 2023-01-03 PROCEDURE — 73110 X-RAY EXAM OF WRIST: CPT | Mod: TC,RT

## 2023-01-03 PROCEDURE — 73110 X-RAY EXAM OF WRIST: CPT | Mod: 26,RT,, | Performed by: RADIOLOGY

## 2023-01-03 PROCEDURE — 99999 PR PBB SHADOW E&M-EST. PATIENT-LVL II: CPT | Mod: PBBFAC,,, | Performed by: PHYSICIAN ASSISTANT

## 2023-01-03 PROCEDURE — 99213 OFFICE O/P EST LOW 20 MIN: CPT | Mod: S$PBB,,, | Performed by: PHYSICIAN ASSISTANT

## 2023-01-03 PROCEDURE — 73110 XR WRIST COMPLETE 3 VIEWS RIGHT: ICD-10-PCS | Mod: 26,RT,, | Performed by: RADIOLOGY

## 2023-01-03 RX ORDER — HYDROCODONE BITARTRATE AND ACETAMINOPHEN 5; 325 MG/1; MG/1
1 TABLET ORAL EVERY 6 HOURS PRN
Qty: 10 TABLET | Refills: 0 | Status: SHIPPED | OUTPATIENT
Start: 2023-01-03 | End: 2023-01-26

## 2023-01-03 RX ORDER — NAPROXEN 500 MG/1
500 TABLET ORAL 2 TIMES DAILY WITH MEALS
Qty: 60 TABLET | Refills: 0 | Status: SHIPPED | OUTPATIENT
Start: 2023-01-03

## 2023-01-03 NOTE — PROGRESS NOTES
[facility]  History & Physical  Orthopedics    SUBJECTIVE:      Chief Complaint: Right distal radius fracture    Referring Provider: No ref. provider found     Dr. Villafana is the supervising physician for this encounter/patient    History of Present Illness:  Patient is a 77 y.o. left hand dominant female who presents today with complaints of right distal radius fracture, seen today with her daughter. The patient is very sleepy and sits in her wheelchair sleeping most of the visit, daughter relays history. Injury occurred on 12/1/22 when she fell at her nursing home. She was seen at [facility] ED, reduction of the distal radius fracture was performed by ortho on call. She has maintained reduction splint since then. She has been taking oxycodone as needed for pain.     Interval History 1/3/23: the patient is seen today along with her daughter for continued treatment of her right distal radius fracture. Due to comorbidities, it was decided to treat conservatively. She was placed in short arm fiberglass cast on 12/13/22, on 12/29/22 she was able to remove the cast due to it loosening. She was seen in the ED that day and placed in sugartong splint which she has maintained since then. Daughter reports that the hand has stayed swollen. She is taking Tylenol or Norco as needed.    Onset of symptoms/DOI was 12/1/22.    Symptoms are aggravated by activity and movement.    Symptoms are alleviated by rest and immobilization.    Symptoms consist of pain, swelling, erythema, and decreased ROM.    The patient rates their pain as a 3/10.    Attempted treatment(s) and/or interventions include activity modifications, rest, closed reduction in the emergency department, cast/splint.     The patient denies any fevers, chills, N/V, D/C and presents for evaluation.       Past Medical History:   Diagnosis Date    Hypertension     Stroke     Unspecified dementia without behavioral disturbance     Vitamin D deficiency       History reviewed. No pertinent surgical history.  Review of patient's allergies indicates:  No Known Allergies  Social History     Social History Narrative    Not on file     No family history on file.      Current Outpatient Medications:     amLODIPine (NORVASC) 2.5 MG tablet, Take 2.5 mg by mouth once daily., Disp: , Rfl:     cephALEXin (KEFLEX) 500 MG capsule, Take 1 capsule (500 mg total) by mouth every 12 (twelve) hours. for 7 days, Disp: 14 capsule, Rfl: 0    donepeziL (ARICEPT) 5 MG tablet, Take 5 mg by mouth every evening., Disp: , Rfl:     ELIQUIS 2.5 mg Tab, Take 2.5 mg by mouth 2 (two) times daily., Disp: , Rfl:     ergocalciferol (ERGOCALCIFEROL) 50,000 unit Cap, Take 50,000 Units by mouth every Wednesday., Disp: , Rfl:     HYDROcodone-acetaminophen (NORCO) 5-325 mg per tablet, Take 1 tablet by mouth every 6 (six) hours as needed for Pain., Disp: 10 tablet, Rfl: 0    losartan (COZAAR) 50 MG tablet, Take 50 mg by mouth 2 (two) times a day., Disp: , Rfl:     oxybutynin (DITROPAN) 5 MG Tab, Take 5 mg by mouth every morning., Disp: , Rfl:     QUEtiapine (SEROQUEL) 25 MG Tab, Take 25 mg by mouth every evening., Disp: , Rfl:       Review of Systems:  Constitutional: no fever or chills  Eyes: no visual changes  ENT: no nasal congestion or sore throat  Respiratory: no cough or shortness of breath  Cardiovascular: no chest pain  Gastrointestinal: no nausea or vomiting, tolerating diet  Musculoskeletal: pain, soreness, and decreased ROM    OBJECTIVE:      Vital Signs (Most Recent):  There were no vitals filed for this visit.  There is no height or weight on file to calculate BMI.      Physical Exam:  Constitutional: The patient appears well-developed and well-nourished. No distress.   Skin: No lesions appreciated  Head: Normocephalic and atraumatic.   Nose: Nose normal.   Ears: No deformities seen  Eyes: Conjunctivae and EOM are normal.   Neck: No tracheal deviation present.   Cardiovascular: Normal rate and  intact distal pulses.    Pulmonary/Chest: Effort normal. No respiratory distress.   Abdominal: There is no guarding.   Neurological: The patient is alert.   Psychiatric: The patient has a normal mood and affect.     Right Hand/Wrist Examination:    Observation/Inspection:  Swelling  Mild right wrist/hand   Deformity  none  Discoloration  Healing ecchymosis present     Scars   none    Atrophy  none    HAND/WRIST EXAMINATION:  Finkelstein's Test   Neg  WHAT Test    Neg  Snuff box tenderness   Neg  Mera's Test    Neg  Hook of Hamate Tenderness  Neg  CMC grind    Neg  Circumduction test   Neg  TTP to the distal radius    Neurovascular Exam:  Digits WWP, brisk CR < 3s throughout  NVI motor/LTS to M/R/U nerves, radial pulse 2+  Tinel's Test - Carpal Tunnel  Neg  Tinel's Test - Cubital Tunnel  Neg  Phalen's Test    Neg  Median Nerve Compression Test Neg    ROM hand: she does move her fingers when asked, does not make a full fist    ROM wrist: not assessed.    ROM elbow: not assessed.    Abdomen not guarded  Respirations nonlabored  Perfusion intact    Diagnostic Results:     Imaging - I independently viewed the patient's imaging as well as the radiology report.      FINDINGS:  Healing comminuted distal radius fracture remain unchanged position as compared to the previous study     Impression:     See above    ASSESSMENT/PLAN:      77 y.o. yo female with Right distal radius fracture, 4.5 weeks from injury    Plan: The patient and I had a thorough discussion today.  We discussed the working diagnosis as well as several other potential alternative diagnoses.  Treatment options were discussed, both conservative and surgical.  Conservative treatment options would include things such as activity modifications, workplace modifications, a period of rest, oral vs topical OTC and prescription anti-inflammatory medications, occupational therapy, splinting/bracing, immobilization, corticosteroid injections, and others.  Surgical  options were discussed as well.     Will continue with conservative treatment, transition to wrist brace today, wear full time for the next 2 weeks. I will see her back in 2 weeks for repeat xrays, at which point we will let her remove brace for hygiene and OT. Naproxen and Norco ordered, discussed elevation and ice for swelling.    Should the patient's symptoms worsen, persist, or fail to improve they should return for reevaluation and I would be happy to see them back anytime.           Please do not hesitate to reach out to us via email, phone, or MyChart with any questions, concerns, or feedback.

## 2023-01-04 ENCOUNTER — TELEPHONE (OUTPATIENT)
Dept: ORTHOPEDICS | Facility: CLINIC | Age: 78
End: 2023-01-04
Payer: MEDICARE

## 2023-01-04 NOTE — TELEPHONE ENCOUNTER
Called pt's daughter and LVM regarding the weight restrictions to her Mother . Informed  she should remain non-weight bearing, no weight through the right hand/wrist as per Adrián.And also informed to call back if there is an questions.

## 2023-01-04 NOTE — TELEPHONE ENCOUNTER
----- Message from Jamie L. Russo-Digeorge, PA-C sent at 1/4/2023 11:50 AM CST -----  Regarding: FW: pt advice  Contact: Loly sawyer/assisted living facility @[phone]  Can you call this patients daughter and let her know that she should remain non-weight bearing, no weight through the right hand/wrist.  ----- Message -----  From: Natalio Ulloa MA  Sent: 1/4/2023   7:15 AM CST  To: Jamie L. Russo-Digeorge, PA-C  Subject: FW: pt advice                                    Good morning Adrián, Pt wants to know her weight restrictions?  ----- Message -----  From: Justa Lou MA  Sent: 12/29/2022   8:46 PM CST  To: Natalio Ulloa MA  Subject: FW: pt advice                                      ----- Message -----  From: Francisca Samuels  Sent: 12/29/2022   2:19 PM CST  To: Russo Digeorge Jamie Staff  Subject: pt advice                                        Caller requesting update to pt care regarding weight restrictions for PT and OT. Fax# [phone]. Pls call to discuss

## 2023-01-05 DIAGNOSIS — R26.9 GAIT DISTURBANCE: Primary | ICD-10-CM

## 2023-01-18 ENCOUNTER — PATIENT MESSAGE (OUTPATIENT)
Dept: ORTHOPEDICS | Facility: CLINIC | Age: 78
End: 2023-01-18
Payer: MEDICARE

## 2023-01-18 DIAGNOSIS — S52.551A OTHER CLOSED EXTRA-ARTICULAR FRACTURE OF DISTAL END OF RIGHT RADIUS, INITIAL ENCOUNTER: Primary | ICD-10-CM

## 2023-01-19 ENCOUNTER — TELEPHONE (OUTPATIENT)
Dept: ORTHOPEDICS | Facility: CLINIC | Age: 78
End: 2023-01-19
Payer: MEDICARE

## 2023-01-19 NOTE — TELEPHONE ENCOUNTER
Spoke with the patient's daughter . Informed of X-rays scheduled prior to appointment.  verbalized understanding and was thankful.

## 2023-01-20 ENCOUNTER — HOSPITAL ENCOUNTER (OUTPATIENT)
Dept: RADIOLOGY | Facility: HOSPITAL | Age: 78
Discharge: HOME | End: 2023-01-20
Attending: PHYSICIAN ASSISTANT
Payer: MEDICARE

## 2023-01-20 ENCOUNTER — OFFICE VISIT (OUTPATIENT)
Dept: ORTHOPEDICS | Facility: CLINIC | Age: 78
End: 2023-01-20
Payer: MEDICARE

## 2023-01-20 DIAGNOSIS — S52.551A OTHER CLOSED EXTRA-ARTICULAR FRACTURE OF DISTAL END OF RIGHT RADIUS, INITIAL ENCOUNTER: ICD-10-CM

## 2023-01-20 DIAGNOSIS — S52.551A OTHER CLOSED EXTRA-ARTICULAR FRACTURE OF DISTAL END OF RIGHT RADIUS, INITIAL ENCOUNTER: Primary | ICD-10-CM

## 2023-01-20 PROCEDURE — 73110 X-RAY EXAM OF WRIST: CPT | Mod: 26,RT,, | Performed by: RADIOLOGY

## 2023-01-20 PROCEDURE — 99213 PR OFFICE/OUTPT VISIT, EST, LEVL III, 20-29 MIN: ICD-10-PCS | Mod: S$PBB,,, | Performed by: PHYSICIAN ASSISTANT

## 2023-01-20 PROCEDURE — 73110 X-RAY EXAM OF WRIST: CPT | Mod: TC,PO,RT

## 2023-01-20 PROCEDURE — 99999 PR PBB SHADOW E&M-EST. PATIENT-LVL II: CPT | Mod: PBBFAC,,, | Performed by: PHYSICIAN ASSISTANT

## 2023-01-20 PROCEDURE — 99213 OFFICE O/P EST LOW 20 MIN: CPT | Mod: S$PBB,,, | Performed by: PHYSICIAN ASSISTANT

## 2023-01-20 PROCEDURE — 73110 XR WRIST COMPLETE 3 VIEWS RIGHT: ICD-10-PCS | Mod: 26,RT,, | Performed by: RADIOLOGY

## 2023-01-20 PROCEDURE — 99999 PR PBB SHADOW E&M-EST. PATIENT-LVL II: ICD-10-PCS | Mod: PBBFAC,,, | Performed by: PHYSICIAN ASSISTANT

## 2023-01-20 PROCEDURE — 99212 OFFICE O/P EST SF 10 MIN: CPT | Mod: PBBFAC,PO | Performed by: PHYSICIAN ASSISTANT

## 2023-01-20 NOTE — PROGRESS NOTES
[facility]  History & Physical  Orthopedics    SUBJECTIVE:      Chief Complaint: Right distal radius fracture    Referring Provider: No ref. provider found     Dr. Villafana is the supervising physician for this encounter/patient    History of Present Illness:  Patient is a 77 y.o. left hand dominant female who presents today with complaints of right distal radius fracture, seen today with her daughter. The patient is very sleepy and sits in her wheelchair sleeping most of the visit, daughter relays history. Injury occurred on 12/1/22 when she fell at her nursing home. She was seen at [facility] ED, reduction of the distal radius fracture was performed by ortho on call. She has maintained reduction splint since then. She has been taking oxycodone as needed for pain.     Interval History 1/3/23: the patient is seen today along with her daughter for continued treatment of her right distal radius fracture. Due to comorbidities, it was decided to treat conservatively. She was placed in short arm fiberglass cast on 12/13/22, on 12/29/22 she was able to remove the cast due to it loosening. She was seen in the ED that day and placed in sugartong splint which she has maintained since then. Daughter reports that the hand has stayed swollen. She is taking Tylenol or Norco as needed.    Interval History 1/20/23: the patient is seen today along with her daughter for continued treatment of her right distal radius fracture. She appears more comfortable and alert today. Swelling has been improving. She has worn her brace full time as instructed. She is working with OT on gait training and gentle hand motion only.    Onset of symptoms/DOI was 12/1/22.    Symptoms are aggravated by activity and movement.    Symptoms are alleviated by rest and immobilization.    Symptoms consist of pain, swelling, erythema, and decreased ROM.    The patient rates their pain as a 3-8/10    Attempted treatment(s) and/or interventions  include activity modifications, rest, closed reduction in the emergency department, cast/splint, brace.     The patient denies any fevers, chills, N/V, D/C and presents for evaluation.       Past Medical History:   Diagnosis Date    Hypertension     Stroke     Unspecified dementia without behavioral disturbance     Vitamin D deficiency      No past surgical history on file.  Review of patient's allergies indicates:  No Known Allergies  Social History     Social History Narrative    Not on file     No family history on file.      Current Outpatient Medications:     amLODIPine (NORVASC) 2.5 MG tablet, Take 2.5 mg by mouth once daily., Disp: , Rfl:     donepeziL (ARICEPT) 5 MG tablet, Take 5 mg by mouth every evening., Disp: , Rfl:     ELIQUIS 2.5 mg Tab, Take 2.5 mg by mouth 2 (two) times daily., Disp: , Rfl:     ergocalciferol (ERGOCALCIFEROL) 50,000 unit Cap, Take 50,000 Units by mouth every Wednesday., Disp: , Rfl:     HYDROcodone-acetaminophen (NORCO) 5-325 mg per tablet, Take 1 tablet by mouth every 6 (six) hours as needed for Pain., Disp: 10 tablet, Rfl: 0    losartan (COZAAR) 50 MG tablet, Take 50 mg by mouth 2 (two) times a day., Disp: , Rfl:     naproxen (NAPROSYN) 500 MG tablet, Take 1 tablet (500 mg total) by mouth 2 (two) times daily with meals., Disp: 60 tablet, Rfl: 0    oxybutynin (DITROPAN) 5 MG Tab, Take 5 mg by mouth every morning., Disp: , Rfl:     QUEtiapine (SEROQUEL) 25 MG Tab, Take 25 mg by mouth every evening., Disp: , Rfl:       Review of Systems:  Constitutional: no fever or chills  Eyes: no visual changes  ENT: no nasal congestion or sore throat  Respiratory: no cough or shortness of breath  Cardiovascular: no chest pain  Gastrointestinal: no nausea or vomiting, tolerating diet  Musculoskeletal: pain, soreness, and decreased ROM    OBJECTIVE:      Vital Signs (Most Recent):  There were no vitals filed for this visit.  There is no height or weight on file to calculate BMI.      Physical  Exam:  Constitutional: The patient appears well-developed and well-nourished. No distress.   Skin: No lesions appreciated  Head: Normocephalic and atraumatic.   Nose: Nose normal.   Ears: No deformities seen  Eyes: Conjunctivae and EOM are normal.   Neck: No tracheal deviation present.   Cardiovascular: Normal rate and intact distal pulses.    Pulmonary/Chest: Effort normal. No respiratory distress.   Abdominal: There is no guarding.   Neurological: The patient is alert.   Psychiatric: The patient has a normal mood and affect.     Right Hand/Wrist Examination:    Observation/Inspection:  Swelling  Mild right wrist/hand, improved from previous visit   Deformity  none  Discoloration  none     Scars   none    Atrophy  none    HAND/WRIST EXAMINATION:  Finkelstein's Test   Neg  WHAT Test    Neg  Snuff box tenderness   Neg  Mera's Test    Neg  Hook of Hamate Tenderness  Neg  CMC grind    Neg  Circumduction test   Neg  Mild TTP to the distal radius, on exam she is more alert and does not complain of much pain with palpation    Neurovascular Exam:  Digits WWP, brisk CR < 3s throughout  NVI motor/LTS to M/R/U nerves, radial pulse 2+  Tinel's Test - Carpal Tunnel  Neg  Tinel's Test - Cubital Tunnel  Neg  Phalen's Test    Neg  Median Nerve Compression Test Neg    ROM hand: she does move her fingers when asked, does not make a full fist    ROM wrist: not assessed.    ROM elbow: not assessed.    Abdomen not guarded  Respirations nonlabored  Perfusion intact    Diagnostic Results:     Imaging - I independently viewed the patient's imaging as well as the radiology report.      Xray today shows stable alignment of the distal radius fracture, no changes.    ASSESSMENT/PLAN:      77 y.o. yo female with Right distal radius fracture, 7 weeks from injury    Plan: The patient and I had a thorough discussion today.  We discussed the working diagnosis as well as several other potential alternative diagnoses.  Xrays reviewed today.  Continue with brace, may remove for hygiene and for OT. OT may work on gentle wrist motion as tolerated. Continue with NWB. I will see her back in 3 weeks for repeat xrays, if stable and improving, will allow her to start with gentle strengthening as tolerated.    Should the patient's symptoms worsen, persist, or fail to improve they should return for reevaluation and I would be happy to see them back anytime.           Please do not hesitate to reach out to us via email, phone, or MyChart with any questions, concerns, or feedback.

## 2023-02-10 ENCOUNTER — OFFICE VISIT (OUTPATIENT)
Dept: ORTHOPEDICS | Facility: CLINIC | Age: 78
End: 2023-02-10
Payer: MEDICARE

## 2023-02-10 ENCOUNTER — HOSPITAL ENCOUNTER (OUTPATIENT)
Dept: RADIOLOGY | Facility: HOSPITAL | Age: 78
Discharge: HOME | End: 2023-02-10
Attending: PHYSICIAN ASSISTANT
Payer: MEDICARE

## 2023-02-10 DIAGNOSIS — S52.551A OTHER CLOSED EXTRA-ARTICULAR FRACTURE OF DISTAL END OF RIGHT RADIUS, INITIAL ENCOUNTER: ICD-10-CM

## 2023-02-10 DIAGNOSIS — S52.551A OTHER CLOSED EXTRA-ARTICULAR FRACTURE OF DISTAL END OF RIGHT RADIUS, INITIAL ENCOUNTER: Primary | ICD-10-CM

## 2023-02-10 PROCEDURE — 73110 X-RAY EXAM OF WRIST: CPT | Mod: 26,RT,, | Performed by: RADIOLOGY

## 2023-02-10 PROCEDURE — 99213 OFFICE O/P EST LOW 20 MIN: CPT | Mod: S$PBB,,, | Performed by: PHYSICIAN ASSISTANT

## 2023-02-10 PROCEDURE — 73110 X-RAY EXAM OF WRIST: CPT | Mod: TC,RT

## 2023-02-10 PROCEDURE — 73110 XR WRIST COMPLETE 3 VIEWS RIGHT: ICD-10-PCS | Mod: 26,RT,, | Performed by: RADIOLOGY

## 2023-02-10 PROCEDURE — 99213 PR OFFICE/OUTPT VISIT, EST, LEVL III, 20-29 MIN: ICD-10-PCS | Mod: S$PBB,,, | Performed by: PHYSICIAN ASSISTANT

## 2023-02-10 NOTE — PROGRESS NOTES
[facility]  History & Physical  Orthopedics    SUBJECTIVE:      Chief Complaint: Right distal radius fracture    Referring Provider: No ref. provider found     Dr. Villafana is the supervising physician for this encounter/patient    History of Present Illness:  Patient is a 77 y.o. left hand dominant female who presents today with complaints of right distal radius fracture, seen today with her daughter. The patient is very sleepy and sits in her wheelchair sleeping most of the visit, daughter relays history. Injury occurred on 12/1/22 when she fell at her nursing home. She was seen at [facility] ED, reduction of the distal radius fracture was performed by ortho on call. She has maintained reduction splint since then. She has been taking oxycodone as needed for pain.     Interval History 1/3/23: the patient is seen today along with her daughter for continued treatment of her right distal radius fracture. Due to comorbidities, it was decided to treat conservatively. She was placed in short arm fiberglass cast on 12/13/22, on 12/29/22 she was able to remove the cast due to it loosening. She was seen in the ED that day and placed in sugartong splint which she has maintained since then. Daughter reports that the hand has stayed swollen. She is taking Tylenol or Norco as needed.    Interval History 1/20/23: the patient is seen today along with her daughter for continued treatment of her right distal radius fracture. She appears more comfortable and alert today. Swelling has been improving. She has worn her brace full time as instructed. She is working with OT on gait training and gentle hand motion only.    Interval History 2/10/23: the patient is seen today for continued treatment of her right distal radius fracture, now 10 weeks from injury. Her daughter tells me that the swelling has continued to improve. She reports that her mother is complaining less about pain. Continues to wear the  brace.    Onset of symptoms/DOI was 12/1/22.    Symptoms are aggravated by activity and movement.    Symptoms are alleviated by rest and immobilization.    Symptoms consist of pain, swelling, erythema, and decreased ROM.    The patient rates their pain as minimal complaints of pain.    Attempted treatment(s) and/or interventions include activity modifications, rest, closed reduction in the emergency department, cast/splint, brace.     The patient denies any fevers, chills, N/V, D/C and presents for evaluation.       Past Medical History:   Diagnosis Date    Hypertension     Stroke     Unspecified dementia without behavioral disturbance     Vitamin D deficiency      No past surgical history on file.  Review of patient's allergies indicates:  No Known Allergies  Social History     Social History Narrative    Not on file     No family history on file.      Current Outpatient Medications:     amLODIPine (NORVASC) 2.5 MG tablet, Take 2.5 mg by mouth once daily., Disp: , Rfl:     donepeziL (ARICEPT) 5 MG tablet, Take 5 mg by mouth every evening., Disp: , Rfl:     ELIQUIS 2.5 mg Tab, Take 2.5 mg by mouth 2 (two) times daily., Disp: , Rfl:     ergocalciferol (ERGOCALCIFEROL) 50,000 unit Cap, Take 50,000 Units by mouth every Wednesday., Disp: , Rfl:     HYDROcodone-acetaminophen (NORCO) 5-325 mg per tablet, TAKE ONE TABLET BY MOUTH EVERY 6 HOURS AS NEEDED FOR PAIN, Disp: 10 tablet, Rfl: 0    losartan (COZAAR) 50 MG tablet, Take 50 mg by mouth 2 (two) times a day., Disp: , Rfl:     naproxen (NAPROSYN) 500 MG tablet, Take 1 tablet (500 mg total) by mouth 2 (two) times daily with meals., Disp: 60 tablet, Rfl: 0    oxybutynin (DITROPAN) 5 MG Tab, Take 5 mg by mouth every morning., Disp: , Rfl:     QUEtiapine (SEROQUEL) 25 MG Tab, Take 25 mg by mouth every evening., Disp: , Rfl:       Review of Systems:  Constitutional: no fever or chills  Eyes: no visual changes  ENT: no nasal congestion or sore throat  Respiratory: no cough or  shortness of breath  Cardiovascular: no chest pain  Gastrointestinal: no nausea or vomiting, tolerating diet  Musculoskeletal: pain, soreness, and decreased ROM    OBJECTIVE:      Vital Signs (Most Recent):  There were no vitals filed for this visit.  There is no height or weight on file to calculate BMI.      Physical Exam:  Constitutional: The patient appears well-developed and well-nourished. No distress.   Skin: No lesions appreciated  Head: Normocephalic and atraumatic.   Nose: Nose normal.   Ears: No deformities seen  Eyes: Conjunctivae and EOM are normal.   Neck: No tracheal deviation present.   Cardiovascular: Normal rate and intact distal pulses.    Pulmonary/Chest: Effort normal. No respiratory distress.   Abdominal: There is no guarding.   Neurological: The patient is alert.   Psychiatric: The patient has a normal mood and affect.     Right Hand/Wrist Examination:    Observation/Inspection:  Swelling  Mild right wrist/hand, improved from previous visit   Deformity  none  Discoloration  none     Scars   none    Atrophy  none    HAND/WRIST EXAMINATION:  Finkelstein's Test   Neg  WHAT Test    Neg  Snuff box tenderness   Neg  Mera's Test    Neg  Hook of Hamate Tenderness  Neg  CMC grind    Neg  Circumduction test   Neg  No response from patient with palpation of the distal radius and distal ulna.    Neurovascular Exam:  Digits WWP, brisk CR < 3s throughout  NVI motor/LTS to M/R/U nerves, radial pulse 2+  Tinel's Test - Carpal Tunnel  Neg  Tinel's Test - Cubital Tunnel  Neg  Phalen's Test    Neg  Median Nerve Compression Test Neg    ROM hand: she does make a full fist and squeeze my fingers without any complaints of pain.    ROM wrist: not assessed.    ROM elbow: not assessed.    Abdomen not guarded  Respirations nonlabored  Perfusion intact    Diagnostic Results:     Imaging - I independently viewed the patient's imaging as well as the radiology report.      FINDINGS:  Reconfirmed now subacute comminuted  impacted distal radial fracture with the suggested intra-articular extension.  The degree of radial and volar displacement of the main distal fracture fragment with respect to the longer proximal shaft fracture demonstrates no significant interval changes.  No definite new fractures.  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     Impression:     No significant interval changes.    ASSESSMENT/PLAN:      77 y.o. yo female with Right distal radius fracture, 10 weeks from injury    Plan: The patient and I had a thorough discussion today.  We discussed the working diagnosis as well as several other potential alternative diagnoses.  Xrays reviewed today. Ok to remove brace for gentle motion as tolerated of the hand/wrist. She may now start to WBAT with the brace on, such as ambulating with her walker. Wean out of brace for WB 3 weeks from now. Daughter will message me for new  occupational therapy referral when needed. RTC on prn basis.    Should the patient's symptoms worsen, persist, or fail to improve they should return for reevaluation and I would be happy to see them back anytime.           Please do not hesitate to reach out to us via email, phone, or MyChart with any questions, concerns, or feedback.

## 2023-06-28 ENCOUNTER — HOSPITAL ENCOUNTER (EMERGENCY)
Facility: OTHER | Age: 78
Discharge: HOME | End: 2023-06-29
Attending: EMERGENCY MEDICINE
Payer: MEDICARE

## 2023-06-28 DIAGNOSIS — N39.0 ACUTE UTI: Primary | ICD-10-CM

## 2023-06-28 DIAGNOSIS — R06.02 SOB (SHORTNESS OF BREATH): ICD-10-CM

## 2023-06-28 DIAGNOSIS — R42 DIZZINESS: ICD-10-CM

## 2023-06-28 DIAGNOSIS — R32 URINARY INCONTINENCE, UNSPECIFIED TYPE: ICD-10-CM

## 2023-06-28 DIAGNOSIS — M25.561 RIGHT KNEE PAIN: ICD-10-CM

## 2023-06-28 LAB
ALBUMIN SERPL BCP-MCNC: 3.5 G/DL (ref 3.5–5.2)
ALP SERPL-CCNC: 86 U/L (ref 55–135)
ALT SERPL W/O P-5'-P-CCNC: 22 U/L (ref 10–44)
ANION GAP SERPL CALC-SCNC: 12 MMOL/L (ref 8–16)
AST SERPL-CCNC: 29 U/L (ref 10–40)
BASOPHILS # BLD AUTO: 0.04 K/UL (ref 0–0.2)
BASOPHILS NFR BLD: 0.4 % (ref 0–1.9)
BILIRUB SERPL-MCNC: 0.4 MG/DL (ref 0.1–1)
BUN SERPL-MCNC: 16 MG/DL (ref 8–23)
CALCIUM SERPL-MCNC: 9 MG/DL (ref 8.7–10.5)
CHLORIDE SERPL-SCNC: 106 MMOL/L (ref 95–110)
CO2 SERPL-SCNC: 21 MMOL/L (ref 23–29)
CREAT SERPL-MCNC: 0.9 MG/DL (ref 0.5–1.4)
DIFFERENTIAL METHOD: ABNORMAL
EOSINOPHIL # BLD AUTO: 0.1 K/UL (ref 0–0.5)
EOSINOPHIL NFR BLD: 1 % (ref 0–8)
ERYTHROCYTE [DISTWIDTH] IN BLOOD BY AUTOMATED COUNT: 14.6 % (ref 11.5–14.5)
EST. GFR  (NO RACE VARIABLE): >60 ML/MIN/1.73 M^2
GLUCOSE SERPL-MCNC: 162 MG/DL (ref 70–110)
HCT VFR BLD AUTO: 41.1 % (ref 37–48.5)
HGB BLD-MCNC: 13.3 G/DL (ref 12–16)
IMM GRANULOCYTES # BLD AUTO: 0.05 K/UL (ref 0–0.04)
IMM GRANULOCYTES NFR BLD AUTO: 0.5 % (ref 0–0.5)
LYMPHOCYTES # BLD AUTO: 1.2 K/UL (ref 1–4.8)
LYMPHOCYTES NFR BLD: 11.3 % (ref 18–48)
MCH RBC QN AUTO: 29.4 PG (ref 27–31)
MCHC RBC AUTO-ENTMCNC: 32.4 G/DL (ref 32–36)
MCV RBC AUTO: 91 FL (ref 82–98)
MONOCYTES # BLD AUTO: 0.7 K/UL (ref 0.3–1)
MONOCYTES NFR BLD: 7.1 % (ref 4–15)
NEUTROPHILS # BLD AUTO: 8.3 K/UL (ref 1.8–7.7)
NEUTROPHILS NFR BLD: 79.7 % (ref 38–73)
NRBC BLD-RTO: 0 /100 WBC
PLATELET # BLD AUTO: 245 K/UL (ref 150–450)
PMV BLD AUTO: 11 FL (ref 9.2–12.9)
POTASSIUM SERPL-SCNC: 4.8 MMOL/L (ref 3.5–5.1)
PROT SERPL-MCNC: 7.2 G/DL (ref 6–8.4)
RBC # BLD AUTO: 4.53 M/UL (ref 4–5.4)
SODIUM SERPL-SCNC: 139 MMOL/L (ref 136–145)
WBC # BLD AUTO: 10.43 K/UL (ref 3.9–12.7)

## 2023-06-28 PROCEDURE — 80053 COMPREHEN METABOLIC PANEL: CPT

## 2023-06-28 PROCEDURE — 25000003 PHARM REV CODE 250

## 2023-06-28 PROCEDURE — 93005 ELECTROCARDIOGRAM TRACING: CPT

## 2023-06-28 PROCEDURE — 96360 HYDRATION IV INFUSION INIT: CPT

## 2023-06-28 PROCEDURE — 85025 COMPLETE CBC W/AUTO DIFF WBC: CPT

## 2023-06-28 PROCEDURE — 93010 ELECTROCARDIOGRAM REPORT: CPT | Mod: ,,, | Performed by: INTERNAL MEDICINE

## 2023-06-28 PROCEDURE — 93010 EKG 12-LEAD: ICD-10-PCS | Mod: ,,, | Performed by: INTERNAL MEDICINE

## 2023-06-28 PROCEDURE — 99285 EMERGENCY DEPT VISIT HI MDM: CPT | Mod: 25

## 2023-06-28 RX ORDER — SODIUM CHLORIDE 9 MG/ML
500 INJECTION, SOLUTION INTRAVENOUS
Status: COMPLETED | OUTPATIENT
Start: 2023-06-28 | End: 2023-06-29

## 2023-06-28 RX ADMIN — SODIUM CHLORIDE 500 ML: 9 INJECTION, SOLUTION INTRAVENOUS at 11:06

## 2023-06-29 VITALS
TEMPERATURE: 99 F | WEIGHT: 150 LBS | SYSTOLIC BLOOD PRESSURE: 121 MMHG | RESPIRATION RATE: 10 BRPM | HEART RATE: 52 BPM | HEIGHT: 62 IN | OXYGEN SATURATION: 94 % | BODY MASS INDEX: 27.6 KG/M2 | DIASTOLIC BLOOD PRESSURE: 63 MMHG

## 2023-06-29 LAB
BACTERIA #/AREA URNS HPF: ABNORMAL /HPF
BILIRUB UR QL STRIP: NEGATIVE
CLARITY UR: CLEAR
COLOR UR: YELLOW
GLUCOSE UR QL STRIP: NEGATIVE
HGB UR QL STRIP: NEGATIVE
HYALINE CASTS #/AREA URNS LPF: 1 /LPF
KETONES UR QL STRIP: NEGATIVE
LEUKOCYTE ESTERASE UR QL STRIP: ABNORMAL
MICROSCOPIC COMMENT: ABNORMAL
NITRITE UR QL STRIP: POSITIVE
PH UR STRIP: 7 [PH] (ref 5–8)
PROT UR QL STRIP: NEGATIVE
RBC #/AREA URNS HPF: 1 /HPF (ref 0–4)
SP GR UR STRIP: 1.01 (ref 1–1.03)
SQUAMOUS #/AREA URNS HPF: 8 /HPF
URN SPEC COLLECT METH UR: ABNORMAL
UROBILINOGEN UR STRIP-ACNC: NEGATIVE EU/DL
WBC #/AREA URNS HPF: 29 /HPF (ref 0–5)

## 2023-06-29 PROCEDURE — 87088 URINE BACTERIA CULTURE: CPT

## 2023-06-29 PROCEDURE — 81000 URINALYSIS NONAUTO W/SCOPE: CPT

## 2023-06-29 PROCEDURE — 87086 URINE CULTURE/COLONY COUNT: CPT

## 2023-06-29 PROCEDURE — 96361 HYDRATE IV INFUSION ADD-ON: CPT

## 2023-06-29 PROCEDURE — 25000003 PHARM REV CODE 250: Performed by: EMERGENCY MEDICINE

## 2023-06-29 PROCEDURE — 87077 CULTURE AEROBIC IDENTIFY: CPT

## 2023-06-29 PROCEDURE — 87186 SC STD MICRODIL/AGAR DIL: CPT

## 2023-06-29 RX ORDER — CEPHALEXIN 500 MG/1
500 CAPSULE ORAL
Status: COMPLETED | OUTPATIENT
Start: 2023-06-29 | End: 2023-06-29

## 2023-06-29 RX ORDER — CEPHALEXIN 500 MG/1
500 CAPSULE ORAL 2 TIMES DAILY
Qty: 10 CAPSULE | Refills: 0 | Status: SHIPPED | OUTPATIENT
Start: 2023-06-29 | End: 2023-07-04

## 2023-06-29 RX ADMIN — SODIUM CHLORIDE 500 ML: 9 INJECTION, SOLUTION INTRAVENOUS at 12:06

## 2023-06-29 RX ADMIN — CEPHALEXIN 500 MG: 500 CAPSULE ORAL at 02:06

## 2023-06-29 NOTE — DISCHARGE INSTRUCTIONS
"Thank you for letting us take care of you today! It was nice meeting you, and I hope you feel better soon.     Call your primary care doctor to make the first available appointment.     Keep all your medical appointments.     Take your regular medication as prescribed. Contact your primary care provider before running out of medication, or for any problems obtaining them.    Do not drive or operate heavy machinery while taking opioid or muscle relaxing medications. Examples include norco, percocet, xanax, valium, flexeril.     Overuse or long term use of pain and sedating medication may lead to addiction, dependence, organ failure, family and work problems, legal problems, accidental overdose, and death.    If you do not have health insurance, you probably can afford it:  Call 1-668.952.5286 ([facility] hotline) or go to www.[place].gov    Your evaluation in the ER does not suggest any emergent or life threatening medical condition requiring admission or immediate intervention beyond that provided in the ER.   However, the signs of a serious problem sometimes take more time to appear.     Do not hesitate to return to the ER if any of the following occur:    Weakness, dizziness, fainting, or loss of consciousness   Fever of 100.4ºF (38ºC) or higher  Any worse symptoms  Any new or concerning symptoms    Fainting (Syncope)  Fainting is a sudden, brief loss of consciousness. When people faint, or pass out, they usually fall down. After they are lying down, most people will recover quickly.    The term doctors use for fainting is syncope (say "[id number]").    Fainting one time is usually nothing to worry about. But it is a good idea to see your doctor, because fainting could have a serious cause.    What causes it?  Fainting is caused by a drop in blood flow to the brain. After you lose consciousness and fall or lie down, more blood can flow to your brain so you wake up again.    Most causes of fainting are usually not " signs of a more serious illness. In these cases, you faint because of:    The vasovagal reflex, which causes the heart rate to slow and the blood vessels to widen, or dilate. As a result, blood pools in the lower body and less blood goes to the brain. This reflex can be triggered by many things, including stress, pain, fear, coughing, holding your breath, and urinating.  Orthostatic hypotension, or a sudden drop in blood pressure when you change position. This can happen if you stand up too fast, get dehydrated, or take certain medicines, such as ones for high blood pressure.  Fainting caused by the vasovagal reflex is often easy to predict. It happens to some people every time they have to get a shot or they see blood. Some people know they are going to faint because they have symptoms beforehand, such as feeling weak, nauseated, hot, or dizzy. After they wake up, they may feel confused, dizzy, or ill for a while.    Some causes of fainting can be serious. These include:    Heart or blood vessel problems such as a blood clot in the lungs, an abnormal heartbeat, a heart valve problem, or heart disease.  Nervous system problems such as seizure, stroke, or TIA.  Sometimes the cause is unknown.      How can you care for yourself?    Drink plenty of fluids so you don't get dehydrated.  Stand up slowly.  See a doctor if you have ongoing dizziness    Do the following to improve pain and swelling:  Rest. Limit the use of the injured body part. This helps prevent further damage to the body part and gives it time to heal. In some cases, you may need a sling, brace, splint, or cast to help keep the body part still until it has healed.   Ice. Applying ice right after an injury helps relieve pain and swelling. Wrap a bag of ice in a thin towel. (Frozen peas also works well.) Then, place it over the injured area. Do this for 10 to 15 minutes every 3 to 4 hours. Continue for the next 1 to 2 days or until your symptoms improve.  Never put ice directly on your skin or ice an area longer than 15 minutes at a time.   Compression. Putting pressure on an injury helps reduce swelling and provides support. Wrap the injured area firmly with an elastic bandage (ACE wrap). Make sure not to wrap the bandage too tightly or you will cut off blood flow to the injured area. If your bandage loosens, rewrap it. Do not wear an elastic bandage overnight.  Elevation. Keeping an injury raised above the level of your heart reduces swelling, pain, and throbbing. For instance, if you have a broken leg, it may help to rest your leg on several pillows when sitting or lying down.

## 2023-06-29 NOTE — ED PROVIDER NOTES
Encounter Date: 6/28/2023       History     Chief Complaint   Patient presents with    Fall     911 responded to a slip / fall out of the wheelchair while in the bathroom.  Family assisted her to the ground to prevent further injury.    Hx: Multiple UTI's/Dementia  Pt becomes very agitated while taking Vitals.         78-year-old female with history of TIA, dementia, and frequent UTI presents to the emergency department for evaluation of syncope today.  Per daughter, patient was in the bathroom washing her hands when she started to feel dizzy, experienced syncope and fell backwards.  Her caregiver was able to catch her and help her to the ground.  She did not hit her head. Per daughter, patient was only out for a few seconds. Per daughter, she did complain of shortness of breath while laying flat on the ground. Per daughter, patient is back to her baseline mental status.  Per daughter, she has been experiencing right leg pain for years.  Has undergone multiple imaging on the right leg with no acute findings.  Per daughter, patient has been eating and drinking well the last few days. Per daughter, she has not been experiencing fever, chills, cold symptoms, shortness of breath, chest pain, abdominal pain, nausea, vomiting, diarrhea, or urinary symptoms.  Per daughter, she is mobile via wheelchair. She use to ambulate with a walker or Rolator but has been unable to since breaking her wrist during a fall a few months ago.     The history is provided by a relative (daughter at bedside). The history is limited by the condition of the patient (Patient with dementia).   Review of patient's allergies indicates:  No Known Allergies  Past Medical History:   Diagnosis Date    Dementia     Frequent urinary tract infections      History reviewed. No pertinent surgical history.  History reviewed. No pertinent family history.  Social History     Tobacco Use    Smoking status: Never    Smokeless tobacco: Never   Substance Use Topics     Alcohol use: Never    Drug use: Never     Review of Systems   Unable to perform ROS: Dementia     Physical Exam     Initial Vitals [06/28/23 2032]   BP Pulse Resp Temp SpO2   (!) 111/55 69 17 98.7 °F (37.1 °C) 97 %      MAP       --         Physical Exam    Nursing note and vitals reviewed.  Constitutional: She appears well-developed and well-nourished. No distress.   HENT:   Head: Normocephalic and atraumatic.   Right Ear: External ear normal.   Left Ear: External ear normal.   Nose: Nose normal.   Mouth/Throat: Oropharynx is clear and moist.   Eyes: Conjunctivae and EOM are normal. Pupils are equal, round, and reactive to light.   Neck: Neck supple.   Normal range of motion.  Cardiovascular:  Normal rate, regular rhythm, normal heart sounds and intact distal pulses.           Pulmonary/Chest: Breath sounds normal. No respiratory distress. She has no wheezes. She has no rhonchi. She has no rales. She exhibits no tenderness.   Abdominal: Abdomen is soft. Bowel sounds are normal. She exhibits no distension and no mass. There is no abdominal tenderness. There is no rebound and no guarding.   Musculoskeletal:         General: Normal range of motion.      Cervical back: Normal range of motion and neck supple.      Comments: Tenderness to palpation of right anterior knee. No crepitus or bony deformity. Small abrasion over the right anterior knee. Full ROM of the knee.      Neurological: She is alert and oriented to person, place, and time. She has normal strength. No cranial nerve deficit or sensory deficit. GCS score is 15. GCS eye subscore is 4. GCS verbal subscore is 5. GCS motor subscore is 6.   Sensation intact to light touch. Neurovascularly intact. No aphasia. No facial droop. No nystagmus. No pronator drift.    Skin: Skin is warm and dry.   Psychiatric:   Agitated.       ED Course   Procedures  Labs Reviewed   CBC W/ AUTO DIFFERENTIAL - Abnormal; Notable for the following components:       Result Value    RDW  14.6 (*)     Gran # (ANC) 8.3 (*)     Immature Grans (Abs) 0.05 (*)     Gran % 79.7 (*)     Lymph % 11.3 (*)     All other components within normal limits   COMPREHENSIVE METABOLIC PANEL - Abnormal; Notable for the following components:    CO2 21 (*)     Glucose 162 (*)     All other components within normal limits   URINALYSIS, REFLEX TO URINE CULTURE   URINALYSIS MICROSCOPIC     EKG Readings: (Independently Interpreted)   Initial Reading: No STEMI.   Normal sinus rhythm at a rate of 63.  Left bundle-branch block present. Per daughter, LBBB is old.      Imaging Results              X-Ray Knee 1 or 2 View Right (Final result)  Result time 06/29/23 00:04:46      Final result by Ambreen العلي MD (06/29/23 00:04:46)                   Impression:      No acute osseous abnormality identified.      Electronically signed by: Ambreen العلي MD  Date:    06/29/2023  Time:    00:04               Narrative:    EXAMINATION:  XR KNEE 1 OR 2 VIEW RIGHT    CLINICAL HISTORY:  Pain in right knee    TECHNIQUE:  AP and lateral views of the right knee were performed.    COMPARISON:  None    FINDINGS:  No evidence of acute displaced fracture, dislocation, or osseous destructive process.  There is medial tibiofemoral compartment joint space narrowing.  Lateral compartment joint space appears fairly well preserved.                                       X-Ray Chest AP Portable (Final result)  Result time 06/28/23 22:21:40   Procedure changed from X-Ray Chest PA And Lateral     Final result by Ambreen العلي MD (06/28/23 22:21:40)                   Impression:      Chronic appearing lung changes with no definite acute cardiopulmonary process identified.      Electronically signed by: Ambreen العلي MD  Date:    06/28/2023  Time:    22:21               Narrative:    EXAMINATION:  XR CHEST AP PORTABLE    CLINICAL HISTORY:  sob; Shortness of breath    TECHNIQUE:  Single frontal view of the chest was  performed.    COMPARISON:  None    FINDINGS:  Cardiac silhouette is normal in size.  Lungs are symmetrically expanded.  Diffuse coarse interstitial lung changes are seen.  No evidence of focal consolidative process, pneumothorax, or significant pleural effusion.  No acute osseous abnormality identified.                                    X-Rays:   Independently Interpreted Readings:   Other Readings:  Right knee  Medications   0.9%  NaCl infusion (0 mLs Intravenous Stopped 6/29/23 0047)   sodium chloride 0.9% bolus 500 mL 500 mL (0 mLs Intravenous Stopped 6/29/23 0153)     Medical Decision Making:   Initial Assessment:   Urgent evaluation of 78 year old female with history of dementia, frequent UTI, and TIA presenting with syncopal episode with prodrome followed by a fall earlier this eveneing. No head trauma. Patient was caught by her caregiver and assisted to the ground. Currently lives in separate section of a home owned by her daughter who also lives on the property. Per daughter, patient felt dizzy prior to experiencing syncope. She has been eating and drinking normally the last few days. No recent illness. On exam, no focal neurological deficits. Tenderness to palpation of right anterior knee. Per daughter, she is back to her baseline mental status. Plan for labs, EKG, CXR and X-ray of right knee.   Clinical Tests:   Lab Tests: Ordered and Reviewed  Radiological Study: Ordered and Reviewed  ED Management:  On review of labs, no leukocytosis. H&H is stable. Glucose elevated at 162. No electrolyte derangement. Rest of CMP unremarkable. UA pending as patient has been unable to provide urine sample. No acute findings on CXR. EKG shows NSR. LBBB present but patient's daughter reports that this is old.   Patient resting comfortably. Continues to remain stable.     Patient care turned over to supervising physician, Dr. Saucedo.            ED Course as of 06/29/23 0431   Wed Jun 28, 2023   2000 Physician Attestation  Statement:   I reviewed the non-physician provider documentation, treatment plan, and medical decision making.     I performed the substantive portion of the visit.      Patient is a ,age F with dementia, recurrent UTI who presents for evaluation of witnessed syncopal episode with prodrome.   No other associated symptoms. She was assisted to the ground. She is now back to her baseline.   I independently reviewed and interpreted labs which are notable for unremarkable CBC and CMP.  I independently reviewed and interpreted CXR which shows no pneumothorax, no focal consolidation, no cardiomegaly, no acute process.  Per the [place] Syncope and [place] Syncope rules, patient is in low risk group for serious outcome.     Patient is in low risk category for by both    [RC]   Thu Jun 29, 2023   0159 Patient pending urinalysis.  After 1 L fluid bolus, she voided approximately 300 mL gold colored urine.    [RC]   0240 I independently interpreted and reviewed patient's urinalysis which is consistent with acute UTI.    --  I discussed with pt and/or guardian/caretaker that this evaluation in the ED does not suggest any emergent or life threatening medical condition requiring admission or further immediate intervention or diagnostics. Regardless, an unremarkable evaluation in the ED does not preclude the development or presence of a serious or life threatening condition. Pt was instructed to return for any worsening, new, changed, or concerning symptoms.     I had a detailed discussion with patient and/or guardian/caretaker regarding findings, plan, return precautions, importance of medication adherence, need to follow-up with a PCP. All questions answered.     Note was created using voice recognition software. It may have occasional typographical errors not identified and edited despite initial review prior to signing.   [RC]      ED Course User Index  [RC] Ignacio Saucedo MD                 Clinical Impression:   Final  diagnoses:  [R42] Dizziness  [R06.02] SOB (shortness of breath)  [M25.561] Right knee pain               Ko Day PA-C  06/29/23 0212       Ignacio Saucedo MD  06/29/23 8235

## 2023-06-29 NOTE — ED TRIAGE NOTES
Pt presents to the ER via EMS. Per EMS pt had a witnessed slip and fall and was helped down to the floor by her care taker. Per pts daughter the pt reported feeling dizzy before she fell and denies the pt hitting her head. Pts daughter also reports the pt has been having pain in her right leg. The pts daughter also reports the pt has a hx of dementia and UTI's. Pt is uncooperative and restless at this time.

## 2023-07-01 LAB — BACTERIA UR CULT: ABNORMAL

## 2024-01-11 ENCOUNTER — OFFICE VISIT (OUTPATIENT)
Dept: OTOLARYNGOLOGY | Facility: CLINIC | Age: 79
End: 2024-01-11
Payer: MEDICARE

## 2024-01-11 DIAGNOSIS — H61.23 BILATERAL IMPACTED CERUMEN: Primary | ICD-10-CM

## 2024-01-11 PROCEDURE — 99499 UNLISTED E&M SERVICE: CPT | Mod: S$GLB,,, | Performed by: NURSE PRACTITIONER

## 2024-01-11 NOTE — PROGRESS NOTES
Procedure Note   Procedure performed:microscopic examination of ears with cerumen disimpaction    Indication for procedure: bilateral cerumen impaction     Description of procedure:  After verbal consent was obtained, the patient was positioned in semi recumbent position and speculum was placed in the right ear and microscope brought into the field.  The microscope was positioned and magnification adjusted for appropriate visualization. Otologic instruments including various size otologic suctions and curette were used to remove the cerumen but patient unable to tolerate procedure due to wax being extremely hard and adhering to sides of canal. Pt daughter advised to start using debrox in her ears everyday until her f/u appt next week.

## 2024-01-18 ENCOUNTER — OFFICE VISIT (OUTPATIENT)
Dept: OTOLARYNGOLOGY | Facility: CLINIC | Age: 79
End: 2024-01-18
Payer: MEDICARE

## 2024-01-18 DIAGNOSIS — H61.23 BILATERAL IMPACTED CERUMEN: Primary | ICD-10-CM

## 2024-01-18 PROCEDURE — 69210 REMOVE IMPACTED EAR WAX UNI: CPT | Mod: S$GLB,,, | Performed by: NURSE PRACTITIONER

## 2024-01-18 PROCEDURE — 99499 UNLISTED E&M SERVICE: CPT | Mod: S$GLB,,, | Performed by: NURSE PRACTITIONER

## 2024-03-28 ENCOUNTER — HOSPITAL ENCOUNTER (OUTPATIENT)
Facility: OTHER | Age: 79
Discharge: HOME | End: 2024-03-29
Attending: INTERNAL MEDICINE | Admitting: INTERNAL MEDICINE
Payer: MEDICARE

## 2024-03-28 DIAGNOSIS — N39.0 URINARY TRACT INFECTION WITH HEMATURIA, SITE UNSPECIFIED: Primary | ICD-10-CM

## 2024-03-28 DIAGNOSIS — R31.9 URINARY TRACT INFECTION WITH HEMATURIA, SITE UNSPECIFIED: Primary | ICD-10-CM

## 2024-03-28 DIAGNOSIS — R55 SYNCOPE: ICD-10-CM

## 2024-03-28 LAB
BACTERIA #/AREA URNS HPF: ABNORMAL /HPF
BASOPHILS # BLD AUTO: 0.06 K/UL (ref 0–0.2)
BASOPHILS NFR BLD: 0.5 % (ref 0–1.9)
BILIRUB UR QL STRIP: NEGATIVE
CLARITY UR: ABNORMAL
COLOR UR: YELLOW
DIFFERENTIAL METHOD BLD: ABNORMAL
EOSINOPHIL # BLD AUTO: 0.2 K/UL (ref 0–0.5)
EOSINOPHIL NFR BLD: 1.5 % (ref 0–8)
ERYTHROCYTE [DISTWIDTH] IN BLOOD BY AUTOMATED COUNT: 14.6 % (ref 11.5–14.5)
GLUCOSE UR QL STRIP: NEGATIVE
HCT VFR BLD AUTO: 42.1 % (ref 37–48.5)
HGB BLD-MCNC: 13.8 G/DL (ref 12–16)
HGB UR QL STRIP: NEGATIVE
HYALINE CASTS #/AREA URNS LPF: 8 /LPF
IMM GRANULOCYTES # BLD AUTO: 0.06 K/UL (ref 0–0.04)
IMM GRANULOCYTES NFR BLD AUTO: 0.5 % (ref 0–0.5)
KETONES UR QL STRIP: ABNORMAL
LEUKOCYTE ESTERASE UR QL STRIP: ABNORMAL
LYMPHOCYTES # BLD AUTO: 1.2 K/UL (ref 1–4.8)
LYMPHOCYTES NFR BLD: 9.5 % (ref 18–48)
MCH RBC QN AUTO: 29.9 PG (ref 27–31)
MCHC RBC AUTO-ENTMCNC: 32.8 G/DL (ref 32–36)
MCV RBC AUTO: 91 FL (ref 82–98)
MICROSCOPIC COMMENT: ABNORMAL
MONOCYTES # BLD AUTO: 1 K/UL (ref 0.3–1)
MONOCYTES NFR BLD: 8.2 % (ref 4–15)
NEUTROPHILS # BLD AUTO: 9.8 K/UL (ref 1.8–7.7)
NEUTROPHILS NFR BLD: 79.8 % (ref 38–73)
NITRITE UR QL STRIP: NEGATIVE
NRBC BLD-RTO: 0 /100 WBC
PH UR STRIP: 6 [PH] (ref 5–8)
PLATELET # BLD AUTO: 258 K/UL (ref 150–450)
PMV BLD AUTO: 10.6 FL (ref 9.2–12.9)
POCT GLUCOSE: 129 MG/DL (ref 70–110)
PROT UR QL STRIP: ABNORMAL
RBC # BLD AUTO: 4.62 M/UL (ref 4–5.4)
RBC #/AREA URNS HPF: 0 /HPF (ref 0–4)
SP GR UR STRIP: 1.02 (ref 1–1.03)
SQUAMOUS #/AREA URNS HPF: 5 /HPF
URN SPEC COLLECT METH UR: ABNORMAL
UROBILINOGEN UR STRIP-ACNC: NEGATIVE EU/DL
WBC # BLD AUTO: 12.26 K/UL (ref 3.9–12.7)
WBC #/AREA URNS HPF: 52 /HPF (ref 0–5)

## 2024-03-28 PROCEDURE — 87186 SC STD MICRODIL/AGAR DIL: CPT | Performed by: INTERNAL MEDICINE

## 2024-03-28 PROCEDURE — 96361 HYDRATE IV INFUSION ADD-ON: CPT

## 2024-03-28 PROCEDURE — 87086 URINE CULTURE/COLONY COUNT: CPT | Performed by: INTERNAL MEDICINE

## 2024-03-28 PROCEDURE — 25000003 PHARM REV CODE 250: Performed by: INTERNAL MEDICINE

## 2024-03-28 PROCEDURE — 85025 COMPLETE CBC W/AUTO DIFF WBC: CPT | Performed by: INTERNAL MEDICINE

## 2024-03-28 PROCEDURE — 81000 URINALYSIS NONAUTO W/SCOPE: CPT | Performed by: INTERNAL MEDICINE

## 2024-03-28 PROCEDURE — 87088 URINE BACTERIA CULTURE: CPT | Performed by: INTERNAL MEDICINE

## 2024-03-28 PROCEDURE — P9612 CATHETERIZE FOR URINE SPEC: HCPCS

## 2024-03-28 PROCEDURE — 93005 ELECTROCARDIOGRAM TRACING: CPT

## 2024-03-28 PROCEDURE — 87077 CULTURE AEROBIC IDENTIFY: CPT | Performed by: INTERNAL MEDICINE

## 2024-03-28 PROCEDURE — 93010 ELECTROCARDIOGRAM REPORT: CPT | Mod: ,,, | Performed by: INTERNAL MEDICINE

## 2024-03-28 PROCEDURE — 82962 GLUCOSE BLOOD TEST: CPT

## 2024-03-28 PROCEDURE — 99284 EMERGENCY DEPT VISIT MOD MDM: CPT | Mod: 25

## 2024-03-28 PROCEDURE — 80053 COMPREHEN METABOLIC PANEL: CPT | Performed by: INTERNAL MEDICINE

## 2024-03-28 RX ORDER — ACETAMINOPHEN 325 MG/1
650 TABLET ORAL
Status: COMPLETED | OUTPATIENT
Start: 2024-03-28 | End: 2024-03-28

## 2024-03-28 RX ADMIN — ACETAMINOPHEN 650 MG: 325 TABLET, FILM COATED ORAL at 11:03

## 2024-03-28 RX ADMIN — SODIUM CHLORIDE 1000 ML: 9 INJECTION, SOLUTION INTRAVENOUS at 11:03

## 2024-03-28 NOTE — Clinical Note
Diagnosis: Urinary tract infection with hematuria, site unspecified [[id number]]   Future Attending Provider: KAPIL RASHID [[id number]]

## 2024-03-29 VITALS
RESPIRATION RATE: 12 BRPM | OXYGEN SATURATION: 95 % | DIASTOLIC BLOOD PRESSURE: 65 MMHG | HEIGHT: 62 IN | TEMPERATURE: 98 F | HEART RATE: 72 BPM | BODY MASS INDEX: 27.44 KG/M2 | SYSTOLIC BLOOD PRESSURE: 145 MMHG

## 2024-03-29 LAB
ALBUMIN SERPL BCP-MCNC: 3.4 G/DL (ref 3.5–5.2)
ALP SERPL-CCNC: 79 U/L (ref 55–135)
ALT SERPL W/O P-5'-P-CCNC: 20 U/L (ref 10–44)
ANION GAP SERPL CALC-SCNC: 9 MMOL/L (ref 8–16)
AST SERPL-CCNC: 21 U/L (ref 10–40)
BILIRUB SERPL-MCNC: 0.3 MG/DL (ref 0.1–1)
BUN SERPL-MCNC: 19 MG/DL (ref 8–23)
CALCIUM SERPL-MCNC: 8.7 MG/DL (ref 8.7–10.5)
CHLORIDE SERPL-SCNC: 105 MMOL/L (ref 95–110)
CO2 SERPL-SCNC: 25 MMOL/L (ref 23–29)
CREAT SERPL-MCNC: 1 MG/DL (ref 0.5–1.4)
EST. GFR  (NO RACE VARIABLE): 58 ML/MIN/1.73 M^2
GLUCOSE SERPL-MCNC: 122 MG/DL (ref 70–110)
POTASSIUM SERPL-SCNC: 3.7 MMOL/L (ref 3.5–5.1)
PROT SERPL-MCNC: 7.1 G/DL (ref 6–8.4)
SODIUM SERPL-SCNC: 139 MMOL/L (ref 136–145)

## 2024-03-29 PROCEDURE — 96365 THER/PROPH/DIAG IV INF INIT: CPT

## 2024-03-29 PROCEDURE — 63600175 PHARM REV CODE 636 W HCPCS: Performed by: INTERNAL MEDICINE

## 2024-03-29 PROCEDURE — 25000003 PHARM REV CODE 250: Performed by: INTERNAL MEDICINE

## 2024-03-29 PROCEDURE — G0378 HOSPITAL OBSERVATION PER HR: HCPCS

## 2024-03-29 RX ORDER — CEPHALEXIN 500 MG/1
500 CAPSULE ORAL 4 TIMES DAILY
Qty: 20 CAPSULE | Refills: 0 | Status: SHIPPED | OUTPATIENT
Start: 2024-03-29 | End: 2024-04-03

## 2024-03-29 RX ADMIN — CEFTRIAXONE SODIUM 1 G: 1 INJECTION, POWDER, FOR SOLUTION INTRAMUSCULAR; INTRAVENOUS at 12:03

## 2024-03-29 NOTE — ED TRIAGE NOTES
Eli Tran, a 78 y.o. female presents to the ED after passing out while using the restroom at home. Pt has hx of dementia and daughter is primary caretaker. Pt's daughter was unable to capture BP at home when she decided to call ems. This has happened previously to pt. Pt denies being in pain.    Pt resting comfortably. Connected to cardiac monitor, BP cuff, and pulse oximeter. ED workup in progress. Call light within reach. Safety measures in place. Denies further needs. Plan of care ongoing.      Chief Complaint   Patient presents with    Loss of Consciousness     Pt arrived via EMS from home, per EMS pt was on toilet having a BM, passed out of toilet, per EMS pt has dementia and the caregiver at home stated pt does have dementia and this has happened in the past     Review of patient's allergies indicates:  No Known Allergies  Past Medical History:   Diagnosis Date    Dementia     Frequent urinary tract infections     Hypertension     Stroke     Unspecified dementia without behavioral disturbance     Vitamin D deficiency      No past surgical history on file.

## 2024-03-29 NOTE — DISCHARGE INSTRUCTIONS
Diagnosis:   1. Urinary tract infection with hematuria, site unspecified    2. Syncope        Tests you had showed:   Labs Reviewed   URINALYSIS, REFLEX TO URINE CULTURE - Abnormal; Notable for the following components:       Result Value    Appearance, UA Hazy (*)     Protein, UA 1+ (*)     Ketones, UA Trace (*)     Leukocytes, UA 1+ (*)     All other components within normal limits    Narrative:     Specimen Source->Urine   CBC W/ AUTO DIFFERENTIAL - Abnormal; Notable for the following components:    RDW 14.6 (*)     Gran # (ANC) 9.8 (*)     Immature Grans (Abs) 0.06 (*)     Gran % 79.8 (*)     Lymph % 9.5 (*)     All other components within normal limits   COMPREHENSIVE METABOLIC PANEL - Abnormal; Notable for the following components:    Glucose 122 (*)     Albumin 3.4 (*)     eGFR 58 (*)     All other components within normal limits   URINALYSIS MICROSCOPIC - Abnormal; Notable for the following components:    WBC, UA 52 (*)     Bacteria Moderate (*)     Hyaline Casts, UA 8 (*)     All other components within normal limits    Narrative:     Specimen Source->Urine   POCT GLUCOSE - Abnormal; Notable for the following components:    POCT Glucose 129 (*)     All other components within normal limits   CULTURE, URINE   POCT GLUCOSE MONITORING CONTINUOUS      No orders to display       Treatments you received were:   Medications   cefTRIAXone (Rocephin) 1 g in dextrose 5 % in water (D5W) 100 mL IVPB (MB+) (1 g Intravenous New Bag 3/29/24 0035)   acetaminophen tablet 650 mg (650 mg Oral Given 3/28/24 2336)   sodium chloride 0.9% bolus 1,000 mL 1,000 mL (0 mLs Intravenous Stopped 3/29/24 0036)       Home Care Instructions:  - Medications: Continue taking your home medications as prescribed    Follow-Up Plan:  - Follow-up with: Primary care doctor within 1-2  days  - Additional testing and/or evaluation will be directed by your primary doctor    Return to the Emergency Department for symptoms including but not limited to:  worsening symptoms, severe back pain, shortness of breath or chest pain, vomiting with inability to hold down fluids, blood in vomit or poop, fevers greater than 100.4°F, passing out/fainting/unconsciousness, or other concerning symptoms.     Future Appointments   Date Time Provider Department Center   4/16/2024 11:00 AM Robbie Tidwell FNP [facility]     You will need to follow up with your primary care doctor about your blood pressure medications.  Please do not continue them at this point as I believe that they can cause low blood pressure and may be the reason your mother passed out

## 2024-03-29 NOTE — ED PROVIDER NOTES
Encounter date: 10:58 PM 03/29/2024    Source of History   EMS/Daughter/Patient    Chief Complaint   Pt presents with:   Loss of Consciousness (Pt arrived via EMS from home, per EMS pt was on toilet having a BM, passed out of toilet, per EMS pt has dementia and the caregiver at home stated pt does have dementia and this has happened in the past)      History Of Present Illness   Eli Tran is a 78 y.o. female with history of dementia, TIA, hypertension, incomplete LBBB, and frequent UTI's who presents via EMS to the ED with chief complaint of LOC onset approximately 9 PM tonight. Daughter reports that the patient woke up this morning and was more tired than normal, so she did not eat breakfast, but the patient has a normal appetite otherwise. Daughter states that she woke the patient up again around 7 PM tonight, the patient had dinner, and then at around 9 PM the patient was making a bowel movement and lost consciousness while on the toilet. Patient is currently complaining of generalized back pain, which the daughter attributes to sitting on the toilet for a long period of time. Daughter denies any falls, injury, or head trauma as she was able to hold the patient upright on the toilet. Daughter notes that the patient is now back to baseline. Daughter also reports that the patient has had previous episodes of LOC while and after making bowel movements, which she believes was due to low blood pressure. Daughter notes that the patient is only on losartan 50 mg PRN, but she has ran out of her prescription 1 week ago and is currently taking amlodipine until the losartan can be refilled. Per daughter, patient is no longer on Eliquis due to history of TIA. Patient denies dysuria, and daughter notes that with previous UTI's the patient denied dysuria but experienced AMS. Daughter notes that the patient has been experiencing intermittent weakness recently, but this is not a new problem.    Denies: Fever, chills, cough,  chest pain, shortness of breath, abdominal pain, nausea, vomiting, unilateral weakness, numbness, and sick contacts.  This is the extent to the patients complaints today here in the emergency department.  Past History   Review of patient's allergies indicates:  No Known Allergies    No current facility-administered medications on file prior to encounter.     Current Outpatient Medications on File Prior to Encounter   Medication Sig Dispense Refill    amLODIPine (NORVASC) 2.5 MG tablet Take 2.5 mg by mouth once daily.      donepeziL (ARICEPT) 5 MG tablet Take 5 mg by mouth every evening.      ELIQUIS 2.5 mg Tab Take 2.5 mg by mouth 2 (two) times daily.      ergocalciferol (ERGOCALCIFEROL) 50,000 unit Cap Take 50,000 Units by mouth every Wednesday.      HYDROcodone-acetaminophen (NORCO) 5-325 mg per tablet TAKE ONE TABLET BY MOUTH EVERY 6 HOURS AS NEEDED FOR PAIN 10 tablet 0    losartan (COZAAR) 50 MG tablet Take 50 mg by mouth 2 (two) times a day.      naproxen (NAPROSYN) 500 MG tablet Take 1 tablet (500 mg total) by mouth 2 (two) times daily with meals. 60 tablet 0    oxybutynin (DITROPAN) 5 MG Tab Take 5 mg by mouth every morning.      QUEtiapine (SEROQUEL) 25 MG Tab Take 25 mg by mouth every evening.         As per HPI and below:  Past Medical History:   Diagnosis Date    Dementia     Frequent urinary tract infections     Hypertension     Stroke     Unspecified dementia without behavioral disturbance     Vitamin D deficiency      No past surgical history on file.    Social History     Socioeconomic History    Marital status: Single   Tobacco Use    Smoking status: Never    Smokeless tobacco: Never   Substance and Sexual Activity    Alcohol use: Never    Drug use: Never   Social History Narrative    ** Merged History Encounter **          Social Determinants of Health     Financial Resource Strain: Low Risk  (1/11/2024)    Overall Financial Resource Strain (CARDIA)     Difficulty of Paying Living Expenses: Not very  "hard   Food Insecurity: No Food Insecurity (1/11/2024)    Hunger Vital Sign     Worried About Running Out of Food in the Last Year: Never true     Ran Out of Food in the Last Year: Never true   Transportation Needs: No Transportation Needs (1/11/2024)    PRAPARE - Transportation     Lack of Transportation (Medical): No     Lack of Transportation (Non-Medical): No   Physical Activity: Insufficiently Active (1/11/2024)    Exercise Vital Sign     Days of Exercise per Week: 1 day     Minutes of Exercise per Session: 20 min   Stress: Patient Declined (1/11/2024)    [place] citizen [place] [employer] - Occupational Stress Questionnaire     Feeling of Stress : Patient declined   Social Connections: Unknown (1/11/2024)    Social Connection and Isolation Panel [NHANES]     Frequency of Communication with Friends and Family: Twice a week     Frequency of Social Gatherings with Friends and Family: More than three times a week     Active Member of Clubs or Organizations: No     Attends Club or Organization Meetings: Never     Marital Status:    Housing Stability: Unknown (1/11/2024)    Housing Stability Vital Sign     Unable to Pay for Housing in the Last Year: Patient declined     Number of Places Lived in the Last Year: 2     Unstable Housing in the Last Year: No       No family history on file.    Physical Exam     Vitals:    03/28/24 2244 03/28/24 2335 03/28/24 2336 03/29/24 0102   BP: 128/60 (!) 120/58  (!) 145/65   Pulse: 62 65  72   Resp: (!) 22 13  12   Temp: 98.1 °F (36.7 °C)      TempSrc: Oral      SpO2: 97%  96% 95%   Height: 5' 2" (1.575 m)        Physical Exam:   Nursing note and vitals reviewed.  Appearance: Well-appearing, nontoxic female in no acute respiratory distress.  Making purposeful movements.  Speaking in full sentences.  Skin: No obvious rashes seen.  Good turgor.  No abrasions.  No ecchymoses.  Eyes: No conjunctival injection. EOMI, PERRL.  Head: NC/AT  Chest: CTAB. No increased work of " breathing, bilateral chest rise.  Cardiovascular: Regular rate and rhythm.  Normal equal bilateral radial pulses.  Abdomen: Soft.  Not distended.  Nontender.  No guarding.  No rebound. No Masses  Musculoskeletal: No obvious deformities.   Neck supple, full range of motion, no obvious deformity.   No tenderness to palpation of cervical through lumbar spine.  No step-offs or deformities. Good range of motion all joints.  Neurologic: Moves all extremities.  Normal sensation.  No facial droop.  Normal speech.    Mental Status:  Alert and oriented x person, not place and time.        Results and Medications    Procedures    Labs Reviewed   URINALYSIS, REFLEX TO URINE CULTURE - Abnormal; Notable for the following components:       Result Value    Appearance, UA Hazy (*)     Protein, UA 1+ (*)     Ketones, UA Trace (*)     Leukocytes, UA 1+ (*)     All other components within normal limits    Narrative:     Specimen Source->Urine   CBC W/ AUTO DIFFERENTIAL - Abnormal; Notable for the following components:    RDW 14.6 (*)     Gran # (ANC) 9.8 (*)     Immature Grans (Abs) 0.06 (*)     Gran % 79.8 (*)     Lymph % 9.5 (*)     All other components within normal limits   COMPREHENSIVE METABOLIC PANEL - Abnormal; Notable for the following components:    Glucose 122 (*)     Albumin 3.4 (*)     eGFR 58 (*)     All other components within normal limits   URINALYSIS MICROSCOPIC - Abnormal; Notable for the following components:    WBC, UA 52 (*)     Bacteria Moderate (*)     Hyaline Casts, UA 8 (*)     All other components within normal limits    Narrative:     Specimen Source->Urine   POCT GLUCOSE - Abnormal; Notable for the following components:    POCT Glucose 129 (*)     All other components within normal limits   CULTURE, URINE   POCT GLUCOSE MONITORING CONTINUOUS       Imaging Results    None             Medications   acetaminophen tablet 650 mg (650 mg Oral Given 3/28/24 8833)   sodium chloride 0.9% bolus 1,000 mL 1,000 mL (0  mLs Intravenous Stopped 3/29/24 0036)   cefTRIAXone (Rocephin) 1 g in dextrose 5 % in water (D5W) 100 mL IVPB (MB+) (0 g Intravenous Stopped 3/29/24 0105)       MDM, Impression and Plan   Previous Records:   I decided to obtain old medical records which is listed here or in ED course: Urine culture on 6/28/23 positive for E.Coli and sensitivity reviewed     Independently Interpreted Test(s):   EKG:  I independently reviewed and interpreted the EKG and my findings are as follows:   Detailed here or in ED course.     Clinical Tests:   Lab Tests: Ordered and Reviewed  Radiological Study: Ordered and Reviewed  Medical Tests: Ordered and Reviewed    Differential diagnosis:  -UTI  -dehydration   -electrolyte abnormalities  -unlikely ICH based off physical exam and history       Initial Assessment & ED Management:    Urgent evaluation of 78 y.o. female with History as above who presents the ED with chief complaint of syncope while in the toilet.  She was noted to have a low blood pressure which improved with fluids with EMS.  She was at her baseline when she arrived to the ED.  On arrival to the ED she is noted to be afebrile, hemodynamically stable in no acute respiratory distress.  Physical exam as above.  Noted to have a UTI treated with IV Rocephin and a L of fluids.  Given Tylenol.  Glucose within normal limits.  CBC and CMP grossly unremarkable.  Urinalysis concerning for UTI.  She was discharged with Keflex after her urinary cultures were reviewed.  Her caregiver/daughter felt comfortable bringing her home.  Gait stable.  Care plan addressed with patient and all those present. All questions answered.  Strict return precautions discussed.  Patient was instructed on the correct follow-up time and route.  They voiced verbal understanding and agreement  with the plan and were deemed stable for discharge.       Medical Decision Making  EMS reports that the caretaker was bringing the patient to the restroom when the  patient experienced LOC while making a bowel movement. EMS notes that this has happened to the patient in the past, but the patient did not return to consciousness as quickly this time around. Patient has a history of breast cancer and brain cancer, which has been in remission. Per EMS, patient had an intal BP of 92/62 with a normal heart rate. After 400 of fluid the patient's BP and GCS improved.    Amount and/or Complexity of Data Reviewed  Labs: ordered.    Risk  OTC drugs.  Prescription drug management.               Please see ED course for discussion of workup and dispo if not listed above.          Final diagnoses:  [R55] Syncope  [N39.0, R31.9] Urinary tract infection with hematuria, site unspecified (Primary)        ED Disposition Condition    Discharge Stable          ED Prescriptions       Medication Sig Dispense Start Date End Date Auth. Provider    cephALEXin (KEFLEX) 500 MG capsule Take 1 capsule (500 mg total) by mouth 4 (four) times daily. for 5 days 20 capsule 3/29/2024 4/3/2024 Enrique Kincaid MD          Follow-up Information       Follow up With Specialties Details Why Contact Info    [facility]  Schedule an appointment as soon as possible for a visit in 2 days or the primary care doctor of your choice [address] [facility] [address]  [phone]      [facility] - Emergency Dept Emergency Medicine  If symptoms worsen [address] [facility] [id number] [phone]            ED Course as of 03/29/24 0159   Fri Mar 29, 2024   0000 EKG shows normal sinus rhythm with ventricular rate of 66 beats per minute.  Noted noted intraventricular delay.  No STEMI.  Similar previous EKGs [HM]      ED Course User Index  [HM] Enrique Kincaid MD          I, Jodie Graff, scribed for, and in the presence of, Enrique Kincaid MD. I performed the scribed service and the documentation accurately describes the services I performed. I attest to the accuracy of the note.      Physician Attestation for Scribe: I, Enrique Kincaid MD , reviewed documentation as scribed in my presence, which is both accurate and complete.    MD Sanjiv Maldonado Heyward B, MD  03/29/24 0159

## 2024-03-31 LAB
OHS QRS DURATION: 126 MS
OHS QTC CALCULATION: 471 MS

## 2024-04-01 LAB — BACTERIA UR CULT: ABNORMAL

## 2024-04-01 RX ORDER — NITROFURANTOIN 25; 75 MG/1; MG/1
100 CAPSULE ORAL 2 TIMES DAILY
Qty: 10 CAPSULE | Refills: 0 | Status: SHIPPED | OUTPATIENT
Start: 2024-04-01 | End: 2024-04-06

## 2024-04-04 NOTE — PROGRESS NOTES
Procedure Note   Procedure performed:microscopic examination of ears with cerumen disimpaction    Indication for procedure: bilateral cerumen impaction     Description of procedure:  After verbal consent was obtained, the patient was positioned in semi recumbent position and speculum was placed in the right ear and microscope brought into the field.  The microscope was positioned and magnification adjusted for appropriate visualization. Otologic instruments including various size otologic suctions and curette were used to remove the cerumen from the right external auditory canals under visualization with the operating microscope. After cleaning, visualization was again performed and at various levels of higher magnification to optimize views of the ear canal, tympanic membrane, ossicles and middle ear space. The same procedure was then repeated for the left ear. Findings as indicated below. All portions of the procedure and examination by otomicroscopy were tolerated well without complication.     Findings:  Right ear: Complete cerumen impaction removed entirely revealing normal external auditory canal; tympanic membrane without bulging, retraction, or perforation; no evidence of middle ear fluid or effusion.   Left ear: Complete cerumen impaction removed entirely revealing normal external auditory canal; tympanic membrane without bulging, retraction, or perforation; no evidence of middle ear fluid or effusion.        F/u months and prn

## 2025-02-07 ENCOUNTER — HOSPITAL ENCOUNTER (EMERGENCY)
Facility: OTHER | Age: 80
Discharge: HOME | End: 2025-02-08
Attending: STUDENT IN AN ORGANIZED HEALTH CARE EDUCATION/TRAINING PROGRAM
Payer: MEDICARE

## 2025-02-07 DIAGNOSIS — R31.9 URINARY TRACT INFECTION WITH HEMATURIA, SITE UNSPECIFIED: Primary | ICD-10-CM

## 2025-02-07 DIAGNOSIS — N39.0 URINARY TRACT INFECTION WITH HEMATURIA, SITE UNSPECIFIED: Primary | ICD-10-CM

## 2025-02-07 DIAGNOSIS — R00.1 BRADYCARDIA: ICD-10-CM

## 2025-02-07 DIAGNOSIS — R40.20 LOSS OF CONSCIOUSNESS: ICD-10-CM

## 2025-02-07 LAB
ALBUMIN SERPL BCP-MCNC: 3.6 G/DL (ref 3.5–5.2)
ALP SERPL-CCNC: 79 U/L (ref 40–150)
ALT SERPL W/O P-5'-P-CCNC: 15 U/L (ref 10–44)
ANION GAP SERPL CALC-SCNC: 7 MMOL/L (ref 8–16)
AST SERPL-CCNC: 15 U/L (ref 10–40)
BASOPHILS # BLD AUTO: 0.07 K/UL (ref 0–0.2)
BASOPHILS NFR BLD: 0.7 % (ref 0–1.9)
BILIRUB SERPL-MCNC: 0.3 MG/DL (ref 0.1–1)
BNP SERPL-MCNC: 129 PG/ML (ref 0–99)
BUN SERPL-MCNC: 14 MG/DL (ref 8–23)
CALCIUM SERPL-MCNC: 9.1 MG/DL (ref 8.7–10.5)
CHLORIDE SERPL-SCNC: 104 MMOL/L (ref 95–110)
CO2 SERPL-SCNC: 25 MMOL/L (ref 23–29)
CREAT SERPL-MCNC: 0.8 MG/DL (ref 0.5–1.4)
DIFFERENTIAL METHOD BLD: ABNORMAL
EOSINOPHIL # BLD AUTO: 0.2 K/UL (ref 0–0.5)
EOSINOPHIL NFR BLD: 1.6 % (ref 0–8)
ERYTHROCYTE [DISTWIDTH] IN BLOOD BY AUTOMATED COUNT: 14.4 % (ref 11.5–14.5)
EST. GFR  (NO RACE VARIABLE): >60 ML/MIN/1.73 M^2
GLUCOSE SERPL-MCNC: 104 MG/DL (ref 70–110)
HCT VFR BLD AUTO: 44.7 % (ref 37–48.5)
HCV AB SERPL QL IA: NEGATIVE
HGB BLD-MCNC: 15 G/DL (ref 12–16)
HIV 1+2 AB+HIV1 P24 AG SERPL QL IA: NEGATIVE
IMM GRANULOCYTES # BLD AUTO: 0.03 K/UL (ref 0–0.04)
IMM GRANULOCYTES NFR BLD AUTO: 0.3 % (ref 0–0.5)
LYMPHOCYTES # BLD AUTO: 1.3 K/UL (ref 1–4.8)
LYMPHOCYTES NFR BLD: 13.7 % (ref 18–48)
MCH RBC QN AUTO: 30.4 PG (ref 27–31)
MCHC RBC AUTO-ENTMCNC: 33.6 G/DL (ref 32–36)
MCV RBC AUTO: 91 FL (ref 82–98)
MONOCYTES # BLD AUTO: 0.8 K/UL (ref 0.3–1)
MONOCYTES NFR BLD: 8 % (ref 4–15)
NEUTROPHILS # BLD AUTO: 7.4 K/UL (ref 1.8–7.7)
NEUTROPHILS NFR BLD: 75.7 % (ref 38–73)
NRBC BLD-RTO: 0 /100 WBC
PLATELET # BLD AUTO: 259 K/UL (ref 150–450)
PMV BLD AUTO: 10.6 FL (ref 9.2–12.9)
POTASSIUM SERPL-SCNC: 3.8 MMOL/L (ref 3.5–5.1)
PROT SERPL-MCNC: 7.4 G/DL (ref 6–8.4)
RBC # BLD AUTO: 4.94 M/UL (ref 4–5.4)
SODIUM SERPL-SCNC: 136 MMOL/L (ref 136–145)
TSH SERPL DL<=0.005 MIU/L-ACNC: 2.12 UIU/ML (ref 0.4–4)
WBC # BLD AUTO: 9.76 K/UL (ref 3.9–12.7)

## 2025-02-07 PROCEDURE — 99285 EMERGENCY DEPT VISIT HI MDM: CPT | Mod: 25

## 2025-02-07 PROCEDURE — 84484 ASSAY OF TROPONIN QUANT: CPT | Performed by: STUDENT IN AN ORGANIZED HEALTH CARE EDUCATION/TRAINING PROGRAM

## 2025-02-07 PROCEDURE — 86803 HEPATITIS C AB TEST: CPT | Performed by: STUDENT IN AN ORGANIZED HEALTH CARE EDUCATION/TRAINING PROGRAM

## 2025-02-07 PROCEDURE — 83880 ASSAY OF NATRIURETIC PEPTIDE: CPT

## 2025-02-07 PROCEDURE — 93010 ELECTROCARDIOGRAM REPORT: CPT | Mod: ,,, | Performed by: INTERNAL MEDICINE

## 2025-02-07 PROCEDURE — 85025 COMPLETE CBC W/AUTO DIFF WBC: CPT

## 2025-02-07 PROCEDURE — 80053 COMPREHEN METABOLIC PANEL: CPT

## 2025-02-07 PROCEDURE — 87040 BLOOD CULTURE FOR BACTERIA: CPT

## 2025-02-07 PROCEDURE — 81000 URINALYSIS NONAUTO W/SCOPE: CPT

## 2025-02-07 PROCEDURE — 93005 ELECTROCARDIOGRAM TRACING: CPT

## 2025-02-07 PROCEDURE — 87389 HIV-1 AG W/HIV-1&-2 AB AG IA: CPT | Performed by: STUDENT IN AN ORGANIZED HEALTH CARE EDUCATION/TRAINING PROGRAM

## 2025-02-07 PROCEDURE — 84443 ASSAY THYROID STIM HORMONE: CPT

## 2025-02-08 VITALS
SYSTOLIC BLOOD PRESSURE: 143 MMHG | BODY MASS INDEX: 25.97 KG/M2 | WEIGHT: 142 LBS | TEMPERATURE: 98 F | HEART RATE: 59 BPM | DIASTOLIC BLOOD PRESSURE: 92 MMHG | OXYGEN SATURATION: 96 % | RESPIRATION RATE: 13 BRPM

## 2025-02-08 LAB
AMORPH CRY URNS QL MICRO: ABNORMAL
BACTERIA #/AREA URNS HPF: ABNORMAL /HPF
BILIRUB UR QL STRIP: NEGATIVE
CLARITY UR: ABNORMAL
COLOR UR: YELLOW
GLUCOSE UR QL STRIP: NEGATIVE
HGB UR QL STRIP: NEGATIVE
HYALINE CASTS #/AREA URNS LPF: 1 /LPF
KETONES UR QL STRIP: NEGATIVE
LEUKOCYTE ESTERASE UR QL STRIP: ABNORMAL
MICROSCOPIC COMMENT: ABNORMAL
NITRITE UR QL STRIP: NEGATIVE
PH UR STRIP: 7 [PH] (ref 5–8)
PROT UR QL STRIP: NEGATIVE
RBC #/AREA URNS HPF: 8 /HPF (ref 0–4)
SP GR UR STRIP: 1.01 (ref 1–1.03)
SQUAMOUS #/AREA URNS HPF: 1 /HPF
TROPONIN I SERPL DL<=0.01 NG/ML-MCNC: 0.01 NG/ML (ref 0–0.03)
URN SPEC COLLECT METH UR: ABNORMAL
UROBILINOGEN UR STRIP-ACNC: NEGATIVE EU/DL
WBC #/AREA URNS HPF: 4 /HPF (ref 0–5)

## 2025-02-08 PROCEDURE — 96365 THER/PROPH/DIAG IV INF INIT: CPT

## 2025-02-08 PROCEDURE — 25000003 PHARM REV CODE 250

## 2025-02-08 PROCEDURE — 63600175 PHARM REV CODE 636 W HCPCS

## 2025-02-08 RX ADMIN — GENTAMICIN SULFATE 322 MG: 40 INJECTION, SOLUTION INTRAMUSCULAR; INTRAVENOUS at 01:02

## 2025-02-08 NOTE — ED TRIAGE NOTES
"Eli Tran, an 79 y.o. female presents to the ED via EMS. Family reports patient passed out and was "out of it" for about 30 minutes.      Chief Complaint   Patient presents with    Altered Mental Status     Hx dementia, family called due to decreased LOC, was somnolent on EMS arrival. Hypotensive, family reports SBP in 80's, increased to  and up to 120 en route. GCS 14 on arrival, .     Review of patient's allergies indicates:  No Known Allergies  Past Medical History:   Diagnosis Date    Dementia     Frequent urinary tract infections     Hypertension     Stroke     Unspecified dementia without behavioral disturbance     Vitamin D deficiency        "

## 2025-02-08 NOTE — ED PROVIDER NOTES
Encounter Date: 2/7/2025       History     Chief Complaint   Patient presents with    Altered Mental Status     Hx dementia, family called due to decreased LOC, was somnolent on EMS arrival. Hypotensive, family reports SBP in 80's, increased to  and up to 120 en route. GCS 14 on arrival, .     79-year-old female with past medical history of dementia, TIA, hypertension, incomplete LBBB, previous syncopal episodes, and frequent UTI's now presents with a chief complaint of loss of consciousness.  Patient was reportedly well today but had episode of loss of consciousness and which she slumped over and was not able to be awoken for roughly 30 minutes.  Patient's daughter tells me that she typically does not get worried when patient has syncopal episode as they are frequent and typically happen the patient had a bowel movement.  Patient was sitting down and her head slumped down and patient's daughter held her up and tells me that she was breathing during the time but remained unresponsive.  However the prolonged loss of consciousness had the patient's family concerned that EMS was activated.  Family reports systolic blood pressure at this time was 80.  Upon arrival by EMS blood pressure was 90 / palpable.  Patient regained consciousness and was at her baseline and denied any chest pain, shortness of breath, nausea, vomiting, dysuria, or diarrhea.    The history is provided by the patient.     Review of patient's allergies indicates:  No Known Allergies  Past Medical History:   Diagnosis Date    Dementia     Frequent urinary tract infections     Hypertension     Stroke     Unspecified dementia without behavioral disturbance     Vitamin D deficiency      History reviewed. No pertinent surgical history.  No family history on file.  Social History     Tobacco Use    Smoking status: Never    Smokeless tobacco: Never   Substance Use Topics    Alcohol use: Never    Drug use: Never     Review of Systems  See HPI      Physical Exam     Initial Vitals   BP Pulse Resp Temp SpO2   02/07/25 2211 02/07/25 2211 02/07/25 2211 02/07/25 2159 02/07/25 2157   (!) 142/68 (!) 55 12 97.5 °F (36.4 °C) (!) 94 %      MAP       --                Physical Exam    Nursing note and vitals reviewed.      Gen:  Awake and alert and pleasantly demented, elderly appearing, no pallor, no jaundice, appears well hydrated  Feels cold  Eye: EOMI, no scleral icterus, no periorbital edema or ecchymosis  Head: Normocephalic, atraumatic, no lesions, scalp appears normal  ENT: Neck supple, no stridor, no masses, no drooling or voice changes  CVS: All distal pulses intact with normal rate and rhythm, no JVD, normal S1/S2, no murmur  Pulm: Normal breath sounds, no wheezes, rales or rhonchi, no increased work of breathing  Abd:  Nondistended, soft, nontender, no organomegaly, no CVAT  Ext: No edema, no lesions, rashes, or deformity  Neuro:   GCS grossly 15 as patient is pleasantly demented  Cranial nerves intact  Pupils equal and reactive to light  RUE  - power/tone intact  - difficult to test sensation given baseline  RLE  - power/tone/sensation intact   - power/tone intact  - difficult to test sensation given baseline  LUE  - power/tone intact  - difficult to test sensation given baseline  LLE  - power/tone intact  - difficult to test sensation given baseline  Psych: normal affect, cooperative, well groomed, makes good eye contact      ED Course   Procedures  Labs Reviewed   CBC W/ AUTO DIFFERENTIAL - Abnormal       Result Value    WBC 9.76      RBC 4.94      Hemoglobin 15.0      Hematocrit 44.7      MCV 91      MCH 30.4      MCHC 33.6      RDW 14.4      Platelets 259      MPV 10.6      Immature Granulocytes 0.3      Gran # (ANC) 7.4      Immature Grans (Abs) 0.03      Lymph # 1.3      Mono # 0.8      Eos # 0.2      Baso # 0.07      nRBC 0      Gran % 75.7 (*)     Lymph % 13.7 (*)     Mono % 8.0      Eosinophil % 1.6      Basophil % 0.7      Differential Method  Automated      Narrative:     Release to patient->Immediate   COMPREHENSIVE METABOLIC PANEL - Abnormal    Sodium 136      Potassium 3.8      Chloride 104      CO2 25      Glucose 104      BUN 14      Creatinine 0.8      Calcium 9.1      Total Protein 7.4      Albumin 3.6      Total Bilirubin 0.3      Alkaline Phosphatase 79      AST 15      ALT 15      eGFR >60      Anion Gap 7 (*)     Narrative:     Release to patient->Immediate   URINALYSIS, REFLEX TO URINE CULTURE - Abnormal    Specimen UA Urine, Clean Catch      Color, UA Yellow      Appearance, UA Hazy (*)     pH, UA 7.0      Specific Gravity, UA 1.010      Protein, UA Negative      Glucose, UA Negative      Ketones, UA Negative      Bilirubin (UA) Negative      Occult Blood UA Negative      Nitrite, UA Negative      Urobilinogen, UA Negative      Leukocytes, UA 1+ (*)     Narrative:     Specimen Source->Urine   B-TYPE NATRIURETIC PEPTIDE - Abnormal     (*)    URINALYSIS MICROSCOPIC - Abnormal    RBC, UA 8 (*)     WBC, UA 4      Bacteria Moderate (*)     Squam Epithel, UA 1      Hyaline Casts, UA 1      Amorphous, UA Occasional      Microscopic Comment SEE COMMENT      Narrative:     Specimen Source->Urine   CULTURE, BLOOD   CULTURE, BLOOD   HEPATITIS C ANTIBODY    Hepatitis C Ab Negative      Narrative:     Release to patient->Immediate   HIV 1 / 2 ANTIBODY    HIV 1/2 Ag/Ab Negative      Narrative:     Release to patient->Immediate   TSH    TSH 2.121      Narrative:     Release to patient->Immediate   TROPONIN I   TROPONIN I    Troponin I 0.008      Narrative:     Release to patient->Immediate          Imaging Results              CT Head Without Contrast (Final result)  Result time 02/07/25 23:00:32      Final result by Ambreen العلي MD (02/07/25 23:00:32)                   Impression:      No acute intracranial abnormalities identified.  No significant detrimental change compared to previous CT head from December 2022.      Electronically signed  by: Ambreen العلي MD  Date:    02/07/2025  Time:    23:00               Narrative:    EXAMINATION:  CT HEAD WITHOUT CONTRAST    CLINICAL HISTORY:  loss of consciousness;    TECHNIQUE:  Low dose axial images were obtained through the head.  Coronal and sagittal reformations were also performed. Contrast was not administered.    COMPARISON:  CT head from December 2022.    FINDINGS:  Postoperative changes of right occipital craniotomy are seen with stable moderate-size region of encephalomalacia in the right occipital lobe.  There is generalized cerebral volume loss and mild chronic microvascular ischemic change.  Small remote lacunar infarct is seen in the right basal ganglia.  No evidence of acute/recent major vascular distribution cerebral infarction, intraparenchymal hemorrhage, or intra-axial space occupying lesion.  There is mild stable prominence of the ventricular system.  No effacement of the skull-base cisterns. No abnormal extra-axial fluid collections or blood products. Visualized paranasal sinuses and mastoid air cells are clear.                                       X-Ray Chest AP Portable (Final result)  Result time 02/07/25 22:46:11      Final result by Ambreen العلي MD (02/07/25 22:46:11)                   Impression:      No acute cardiopulmonary process identified.      Electronically signed by: Ambreen العلي MD  Date:    02/07/2025  Time:    22:46               Narrative:    EXAMINATION:  XR CHEST AP PORTABLE    CLINICAL HISTORY:  Unspecified coma    TECHNIQUE:  Single frontal view of the chest was performed.    COMPARISON:  June 2023.    FINDINGS:  Cardiac silhouette is normal in size.  Lungs are symmetrically expanded.  No evidence of focal consolidative process, pneumothorax, or significant pleural effusion.  No acute osseous abnormality identified.                                       Medications   gentamicin (GARAMYCIN) 322 mg in 0.9% NaCl 100 mL IVPB (322 mg Intravenous New Bag 2/8/25  0123)     Medical Decision Making  Initial assessment  79-year-old female with past medical history of dementia, TIA, hypertension, incomplete LBBB, previous syncopal episodes, and frequent UTI's now presents with a chief complaint of loss of consciousness. Patient is able to vocalise, breathing spontaneously, hemodynamically stable, oriented, moving all 4 limbs spontaneously.  Examination consistent with pleasantly demented female grossly unremarkable examination.      Differential diagnosis  Syncope in setting of   - dysrhythmia  - electrolyte/metabolic derangements  - ACS  - considered PE but lower suspicion given absence of chest pain or shortness of breath  - intracranial bleeding/CVA  - infection including UTI, pneumonia and considered meningitis but lower suspicion      ED management  On arrival patient was well-appearing and pleasantly demented with no obvious findings on physical examination.  Given history of loss of consciousness I had concern for syncope and performed workup for above-mentioned differential  CBC and CMP within normal limits for patient's baseline.  Troponin negative.  BNP slightly elevated but patient has not had any findings concerning for fluid overload and I have low suspicion for CHF.  CT head with large chronic defect but no acute findings.  Chest x-ray with the findings concerning for pneumonia.  UA consistent with UTI.  EKG consistent with bradycardia 50 without ischemia.    Given workup I suspect patient has syncopal episode in setting of bradycardia complicated by UTI.  Through joint decision making patient and her primary caregiver wished not to be admitted for telemetry or further observation.  One in done gentamicin was given for UTI and patient was discharged with strong return precautions for worsening or recurring syncope.    Amount and/or Complexity of Data Reviewed  Labs: ordered. Decision-making details documented in ED Course.  Radiology: ordered. Decision-making  details documented in ED Course.  ECG/medicine tests:  Decision-making details documented in ED Course.               ED Course as of 02/08/25 0141 Fri Feb 07, 2025 2200 Temp: 97.5 °F (36.4 °C) [PM]   2200 SpO2(!): 94 % [PM]   2255 Temp: 97.5 °F (36.4 °C) [PM]   2255 Pulse(!): 55 [PM]   2255 EKG 12-lead  Sinus bradycardia with rate 55.  Left bundle-branch block.  No ST depressions or elevations.  Nonspecific ST changes [PM]   Sat Feb 08, 2025 0010 CBC auto differential(!)  Benign [PM]   0010 Comprehensive metabolic panel(!)  Benign [PM]   0010 TSH: 2.121 [PM]   0010 BNP(!): 129 [PM]   0011 X-Ray Chest AP Portable  As per my interpretation, the chest x-ray is grossly normal with no acute findings concerning for new infiltrates, new edema, new effusions, changes in cardiac silhouette.  [PM]   0029 CT Head Without Contrast  As per my interpretation there are no acute findings suggestive of new infarcts, fractures, space-occupying lesions, or infection   [PM]   0029 Bacteria, UA(!): Moderate  Concerning for UTI and will give 1 and on gentamicin [PM]   0139 Troponin I: 0.008 [PM]      ED Course User Index  [PM] Samantha Bowling MD                           Clinical Impression:  Final diagnoses:  [R40.20] Loss of consciousness  [N39.0, R31.9] Urinary tract infection with hematuria, site unspecified (Primary)  [R00.1] Bradycardia          ED Disposition Condition    Discharge Stable          ED Prescriptions    None       Follow-up Information       Follow up With Specialties Details Why Contact Info    [facility] Dept Emergency Medicine   [address] [facility] [id number] [phone]    Your Primary Care Doctor  Schedule an appointment as soon as possible for a visit in 3 days               Samantha Bowling MD  Resident  02/08/25 0141

## 2025-02-09 LAB
OHS QRS DURATION: 130 MS
OHS QTC CALCULATION: 480 MS

## 2025-02-13 LAB
BACTERIA BLD CULT: NORMAL
BACTERIA BLD CULT: NORMAL